# Patient Record
Sex: FEMALE | Race: WHITE | NOT HISPANIC OR LATINO
[De-identification: names, ages, dates, MRNs, and addresses within clinical notes are randomized per-mention and may not be internally consistent; named-entity substitution may affect disease eponyms.]

---

## 2017-04-20 ENCOUNTER — RX RENEWAL (OUTPATIENT)
Age: 67
End: 2017-04-20

## 2017-04-28 DIAGNOSIS — N63 UNSPECIFIED LUMP IN BREAST: ICD-10-CM

## 2017-05-09 ENCOUNTER — OUTPATIENT (OUTPATIENT)
Dept: OUTPATIENT SERVICES | Facility: HOSPITAL | Age: 67
LOS: 1 days | Discharge: HOME | End: 2017-05-09

## 2017-05-09 ENCOUNTER — APPOINTMENT (OUTPATIENT)
Dept: HEMATOLOGY ONCOLOGY | Facility: CLINIC | Age: 67
End: 2017-05-09

## 2017-05-09 VITALS
TEMPERATURE: 98.1 F | HEART RATE: 97 BPM | RESPIRATION RATE: 14 BRPM | DIASTOLIC BLOOD PRESSURE: 86 MMHG | HEIGHT: 65 IN | SYSTOLIC BLOOD PRESSURE: 138 MMHG | WEIGHT: 200 LBS | BODY MASS INDEX: 33.32 KG/M2

## 2017-05-10 LAB
ALBUMIN SERPL-MCNC: 4.3 G/DL
ALBUMIN/GLOB SERPL: 1.87
ALP SERPL-CCNC: 67 IU/L
ALT SERPL-CCNC: 27 IU/L
ANION GAP SERPL CALC-SCNC: 13 MEQ/L
AST SERPL-CCNC: 26 IU/L
BILIRUB SERPL-MCNC: 1 MG/DL
BUN SERPL-MCNC: 12 MG/DL
BUN/CREAT SERPL: 18.8 %
CALCIUM SERPL-MCNC: 10.1 MG/DL
CHLORIDE SERPL-SCNC: 99 MEQ/L
CO2 SERPL-SCNC: 27 MEQ/L
CREAT SERPL-MCNC: 0.64 MG/DL
GFR SERPL CREATININE-BSD FRML MDRD: 93
GLUCOSE SERPL-MCNC: 84 MG/DL
POTASSIUM SERPL-SCNC: 4.4 MMOL/L
PROT SERPL-MCNC: 6.6 G/DL
SODIUM SERPL-SCNC: 139 MEQ/L

## 2017-05-23 ENCOUNTER — OUTPATIENT (OUTPATIENT)
Dept: OUTPATIENT SERVICES | Facility: HOSPITAL | Age: 67
LOS: 1 days | Discharge: HOME | End: 2017-05-23

## 2017-06-28 DIAGNOSIS — N63 UNSPECIFIED LUMP IN BREAST: ICD-10-CM

## 2017-06-28 DIAGNOSIS — D24.1 BENIGN NEOPLASM OF RIGHT BREAST: ICD-10-CM

## 2018-10-22 ENCOUNTER — FORM ENCOUNTER (OUTPATIENT)
Age: 68
End: 2018-10-22

## 2018-10-23 ENCOUNTER — OUTPATIENT (OUTPATIENT)
Dept: OUTPATIENT SERVICES | Facility: HOSPITAL | Age: 68
LOS: 1 days | Discharge: HOME | End: 2018-10-23

## 2018-10-23 DIAGNOSIS — R92.8 OTHER ABNORMAL AND INCONCLUSIVE FINDINGS ON DIAGNOSTIC IMAGING OF BREAST: ICD-10-CM

## 2018-10-30 ENCOUNTER — FORM ENCOUNTER (OUTPATIENT)
Age: 68
End: 2018-10-30

## 2018-10-31 ENCOUNTER — RESULT REVIEW (OUTPATIENT)
Age: 68
End: 2018-10-31

## 2018-10-31 ENCOUNTER — OUTPATIENT (OUTPATIENT)
Dept: OUTPATIENT SERVICES | Facility: HOSPITAL | Age: 68
LOS: 1 days | Discharge: HOME | End: 2018-10-31

## 2018-11-02 LAB — SURGICAL PATHOLOGY STUDY: SIGNIFICANT CHANGE UP

## 2018-11-06 DIAGNOSIS — N63.20 UNSPECIFIED LUMP IN THE LEFT BREAST, UNSPECIFIED QUADRANT: ICD-10-CM

## 2018-11-06 DIAGNOSIS — N64.2 ATROPHY OF BREAST: ICD-10-CM

## 2018-11-06 DIAGNOSIS — R92.1 MAMMOGRAPHIC CALCIFICATION FOUND ON DIAGNOSTIC IMAGING OF BREAST: ICD-10-CM

## 2019-01-30 ENCOUNTER — LABORATORY RESULT (OUTPATIENT)
Age: 69
End: 2019-01-30

## 2019-01-30 ENCOUNTER — APPOINTMENT (OUTPATIENT)
Dept: HEMATOLOGY ONCOLOGY | Facility: CLINIC | Age: 69
End: 2019-01-30

## 2019-01-30 VITALS
HEART RATE: 117 BPM | SYSTOLIC BLOOD PRESSURE: 158 MMHG | BODY MASS INDEX: 33.99 KG/M2 | HEIGHT: 65 IN | TEMPERATURE: 97.9 F | WEIGHT: 204 LBS | RESPIRATION RATE: 16 BRPM | DIASTOLIC BLOOD PRESSURE: 96 MMHG

## 2019-01-30 DIAGNOSIS — N63.20 UNSPECIFIED LUMP IN THE LEFT BREAST, UNSPECIFIED QUADRANT: ICD-10-CM

## 2019-01-30 NOTE — REVIEW OF SYSTEMS
[Fever] : no fever [Eye Pain] : no eye pain [Dysphagia] : no dysphagia [Chest Pain] : no chest pain [Shortness Of Breath] : no shortness of breath [Abdominal Pain] : no abdominal pain [Dysuria] : no dysuria [Joint Pain] : joint pain [Joint Stiffness] : joint stiffness [Muscle Pain] : no muscle pain [Muscle Weakness] : no muscle weakness [Confused] : no confusion [Dizziness] : no dizziness [Fainting] : no fainting [Difficulty Walking] : no difficulty walking [Negative] : Allergic/Immunologic

## 2019-01-30 NOTE — ASSESSMENT
[FreeTextEntry1] : 1. Left papillary carcinoma stage O TiCNXMX, ER/MS+, HER2-; s/p lumpectomy 2014 f/b RT and tamoxifen\par 2. left DCIS s/p lumpectomy 2014\par \par - suggest tamoxifen for total of 10 years (started 2014)\par - dexa scan\par rtc in one month [Curative] : Goals of care discussed with patient: Curative

## 2019-01-30 NOTE — HISTORY OF PRESENT ILLNESS
[de-identified] : 69 yo woman comes for a follow up. She was diagnosed with left breast papillary carcinoma s/p resection in 2014 Stage O TisNXMX, ER/LA+,HER2-, f/b RT and hormonal blockade with tamoxifen. Repeat mammogram revealed an abnormality in 2014 and lumpectomy revealed left DCIS. She continued taking tamoxifen. \par \par FH : SISTER had breast cancer at the age of 30; father had lung cancer\par PMH: HTN, arthritis\par

## 2019-01-31 LAB
ALBUMIN SERPL ELPH-MCNC: 4.6 G/DL
ALP BLD-CCNC: 98 U/L
ALT SERPL-CCNC: 26 U/L
ANION GAP SERPL CALC-SCNC: 17 MMOL/L
AST SERPL-CCNC: 23 U/L
BILIRUB DIRECT SERPL-MCNC: <0.2 MG/DL
BILIRUB INDIRECT SERPL-MCNC: NORMAL MG/DL
BILIRUB SERPL-MCNC: 0.2 MG/DL
BUN SERPL-MCNC: 16 MG/DL
CALCIUM SERPL-MCNC: 9.5 MG/DL
CHLORIDE SERPL-SCNC: 96 MMOL/L
CO2 SERPL-SCNC: 26 MMOL/L
CREAT SERPL-MCNC: 0.6 MG/DL
GLUCOSE SERPL-MCNC: 99 MG/DL
HCT VFR BLD CALC: 37.1 %
HGB BLD-MCNC: 12.6 G/DL
MCHC RBC-ENTMCNC: 30 PG
MCHC RBC-ENTMCNC: 34 G/DL
MCV RBC AUTO: 88.3 FL
PLATELET # BLD AUTO: 247 K/UL
PMV BLD: 9.4 FL
POTASSIUM SERPL-SCNC: 3.9 MMOL/L
PROT SERPL-MCNC: 7.2 G/DL
RBC # BLD: 4.2 M/UL
RBC # FLD: 12.8 %
SODIUM SERPL-SCNC: 139 MMOL/L
WBC # FLD AUTO: 7.5 K/UL

## 2019-02-06 ENCOUNTER — OUTPATIENT (OUTPATIENT)
Dept: OUTPATIENT SERVICES | Facility: HOSPITAL | Age: 69
LOS: 1 days | Discharge: HOME | End: 2019-02-06

## 2019-02-07 DIAGNOSIS — Z13.820 ENCOUNTER FOR SCREENING FOR OSTEOPOROSIS: ICD-10-CM

## 2019-02-07 DIAGNOSIS — N95.9 UNSPECIFIED MENOPAUSAL AND PERIMENOPAUSAL DISORDER: ICD-10-CM

## 2019-02-27 ENCOUNTER — APPOINTMENT (OUTPATIENT)
Dept: HEMATOLOGY ONCOLOGY | Facility: CLINIC | Age: 69
End: 2019-02-27

## 2019-02-27 ENCOUNTER — OUTPATIENT (OUTPATIENT)
Dept: OUTPATIENT SERVICES | Facility: HOSPITAL | Age: 69
LOS: 1 days | Discharge: HOME | End: 2019-02-27

## 2019-02-27 VITALS
SYSTOLIC BLOOD PRESSURE: 159 MMHG | WEIGHT: 204 LBS | TEMPERATURE: 99 F | HEIGHT: 65 IN | HEART RATE: 114 BPM | RESPIRATION RATE: 16 BRPM | DIASTOLIC BLOOD PRESSURE: 93 MMHG | BODY MASS INDEX: 33.99 KG/M2

## 2019-02-27 DIAGNOSIS — C50.919 MALIGNANT NEOPLASM OF UNSPECIFIED SITE OF UNSPECIFIED FEMALE BREAST: ICD-10-CM

## 2019-02-27 NOTE — HISTORY OF PRESENT ILLNESS
[de-identified] : 69 yo woman comes for a follow up. She was diagnosed with left breast papillary carcinoma s/p resection in 2014 Stage O TisNXMX, ER/WY+,HER2-, f/b RT and hormonal blockade with tamoxifen. Repeat mammogram revealed an abnormality in 2014 and lumpectomy revealed left DCIS. She continued taking tamoxifen. \par \par FH : SISTER had breast cancer at the age of 30; father had lung cancer\par PMH: HTN, arthritis\par \par DEXA 2019: no evidence of osteopenia/osteoporosis

## 2019-02-27 NOTE — ASSESSMENT
[FreeTextEntry1] : 1. Left papillary carcinoma stage O TiCNXMX, ER/WA+, HER2-; s/p lumpectomy 2014 f/b RT and tamoxifen\par 2. left DCIS s/p lumpectomy 2014\par \par - suggest tamoxifen for total of 10 years (started 2014)\par - dexa scan reviewed ; normal density\par - mammogram in 6 mos\par rtc in 6 mos [Curative] : Goals of care discussed with patient: Curative

## 2019-08-20 ENCOUNTER — APPOINTMENT (OUTPATIENT)
Dept: HEMATOLOGY ONCOLOGY | Facility: CLINIC | Age: 69
End: 2019-08-20
Payer: MEDICARE

## 2019-08-20 ENCOUNTER — OUTPATIENT (OUTPATIENT)
Dept: OUTPATIENT SERVICES | Facility: HOSPITAL | Age: 69
LOS: 1 days | Discharge: HOME | End: 2019-08-20

## 2019-08-20 VITALS
SYSTOLIC BLOOD PRESSURE: 160 MMHG | HEIGHT: 65 IN | WEIGHT: 204 LBS | HEART RATE: 97 BPM | DIASTOLIC BLOOD PRESSURE: 80 MMHG | RESPIRATION RATE: 16 BRPM | BODY MASS INDEX: 33.99 KG/M2 | TEMPERATURE: 98.2 F

## 2019-08-20 PROCEDURE — 99214 OFFICE O/P EST MOD 30 MIN: CPT

## 2019-08-20 NOTE — ASSESSMENT
[Curative] : Goals of care discussed with patient: Curative [FreeTextEntry1] : 1. Left papillary carcinoma stage O TiCNXMX, ER/AR+, HER2-; s/p lumpectomy 2014 f/b RT and tamoxifen\par 2. left DCIS s/p lumpectomy 2014\par \par - suggest hormonal blockade for total of 10 years (started 2014). However given that she is having hot flashes almost every day, we discussed either stopping it or changing it to AI. After discussion,  she will return to visit us in one month and will let us know if she would like to take AI. Till then, she will not take tamoxifen. \par - dexa scan reviewed ; normal density\par - mammogram due in Oct 2019\par -rtc in 1 mos

## 2019-08-20 NOTE — HISTORY OF PRESENT ILLNESS
[de-identified] : 67 yo woman comes for a follow up. She was diagnosed with left breast papillary carcinoma s/p resection in 2014 Stage O TisNXMX, ER/PA+,HER2-, f/b RT and hormonal blockade with tamoxifen. Repeat mammogram revealed an abnormality in 2014 and lumpectomy revealed left DCIS. She continued taking tamoxifen. \par \par FH : SISTER had breast cancer at the age of 30; father had lung cancer\par PMH: HTN, arthritis\par \par DEXA 2019: no evidence of osteopenia/osteoporosis [de-identified] : 08/20/2019\par Here for a scheduled follow up visit. \par Completed 5 years of tamoxifen. tolerated well but has bad hot flashes almost every day. Denies joint pains. \par Mammogram due in 2019 october

## 2019-08-20 NOTE — REVIEW OF SYSTEMS
[Joint Pain] : joint pain [Joint Stiffness] : joint stiffness [Negative] : Heme/Lymph [Fever] : no fever [Eye Pain] : no eye pain [Dysphagia] : no dysphagia [Chest Pain] : no chest pain [Abdominal Pain] : no abdominal pain [Shortness Of Breath] : no shortness of breath [Dysuria] : no dysuria [Muscle Pain] : no muscle pain [Confused] : no confusion [Muscle Weakness] : no muscle weakness [Dizziness] : no dizziness [Fainting] : no fainting [Difficulty Walking] : no difficulty walking

## 2019-08-22 DIAGNOSIS — D05.10 INTRADUCTAL CARCINOMA IN SITU OF UNSPECIFIED BREAST: ICD-10-CM

## 2019-10-02 ENCOUNTER — APPOINTMENT (OUTPATIENT)
Dept: HEMATOLOGY ONCOLOGY | Facility: CLINIC | Age: 69
End: 2019-10-02

## 2019-11-12 ENCOUNTER — EMERGENCY (EMERGENCY)
Facility: HOSPITAL | Age: 69
LOS: 0 days | Discharge: HOME | End: 2019-11-12
Attending: EMERGENCY MEDICINE | Admitting: EMERGENCY MEDICINE
Payer: MEDICARE

## 2019-11-12 VITALS
WEIGHT: 199.08 LBS | HEIGHT: 65 IN | SYSTOLIC BLOOD PRESSURE: 173 MMHG | HEART RATE: 101 BPM | TEMPERATURE: 97 F | RESPIRATION RATE: 20 BRPM | DIASTOLIC BLOOD PRESSURE: 90 MMHG | OXYGEN SATURATION: 98 %

## 2019-11-12 VITALS
SYSTOLIC BLOOD PRESSURE: 150 MMHG | HEART RATE: 97 BPM | TEMPERATURE: 98 F | DIASTOLIC BLOOD PRESSURE: 99 MMHG | OXYGEN SATURATION: 99 % | RESPIRATION RATE: 18 BRPM

## 2019-11-12 DIAGNOSIS — R42 DIZZINESS AND GIDDINESS: ICD-10-CM

## 2019-11-12 DIAGNOSIS — I10 ESSENTIAL (PRIMARY) HYPERTENSION: ICD-10-CM

## 2019-11-12 DIAGNOSIS — Z82.49 FAMILY HISTORY OF ISCHEMIC HEART DISEASE AND OTHER DISEASES OF THE CIRCULATORY SYSTEM: ICD-10-CM

## 2019-11-12 LAB
ALBUMIN SERPL ELPH-MCNC: 4.6 G/DL — SIGNIFICANT CHANGE UP (ref 3.5–5.2)
ALP SERPL-CCNC: 92 U/L — SIGNIFICANT CHANGE UP (ref 30–115)
ALT FLD-CCNC: 24 U/L — SIGNIFICANT CHANGE UP (ref 0–41)
ANION GAP SERPL CALC-SCNC: 11 MMOL/L — SIGNIFICANT CHANGE UP (ref 7–14)
AST SERPL-CCNC: 23 U/L — SIGNIFICANT CHANGE UP (ref 0–41)
BILIRUB SERPL-MCNC: 0.4 MG/DL — SIGNIFICANT CHANGE UP (ref 0.2–1.2)
BUN SERPL-MCNC: 15 MG/DL — SIGNIFICANT CHANGE UP (ref 10–20)
CA-I SERPL-SCNC: 1.25 MMOL/L — SIGNIFICANT CHANGE UP (ref 1.12–1.3)
CALCIUM SERPL-MCNC: 9.1 MG/DL — SIGNIFICANT CHANGE UP (ref 8.5–10.1)
CHLORIDE SERPL-SCNC: 104 MMOL/L — SIGNIFICANT CHANGE UP (ref 98–110)
CO2 SERPL-SCNC: 24 MMOL/L — SIGNIFICANT CHANGE UP (ref 17–32)
CREAT SERPL-MCNC: 0.6 MG/DL — LOW (ref 0.7–1.5)
GAS PNL BLDV: 141 MMOL/L — SIGNIFICANT CHANGE UP (ref 136–145)
GAS PNL BLDV: SIGNIFICANT CHANGE UP
GLUCOSE SERPL-MCNC: 129 MG/DL — HIGH (ref 70–99)
HCO3 BLDV-SCNC: 26 MMOL/L — SIGNIFICANT CHANGE UP (ref 22–29)
HCT VFR BLD CALC: 37.5 % — SIGNIFICANT CHANGE UP (ref 37–47)
HCT VFR BLDA CALC: 39.4 % — SIGNIFICANT CHANGE UP (ref 34–44)
HGB BLD CALC-MCNC: 12.9 G/DL — LOW (ref 14–18)
HGB BLD-MCNC: 12.4 G/DL — SIGNIFICANT CHANGE UP (ref 12–16)
HOROWITZ INDEX BLDV+IHG-RTO: 21 — SIGNIFICANT CHANGE UP
LACTATE BLDV-MCNC: 2.1 MMOL/L — HIGH (ref 0.5–1.6)
MCHC RBC-ENTMCNC: 29.8 PG — SIGNIFICANT CHANGE UP (ref 27–31)
MCHC RBC-ENTMCNC: 33.1 G/DL — SIGNIFICANT CHANGE UP (ref 32–37)
MCV RBC AUTO: 90.1 FL — SIGNIFICANT CHANGE UP (ref 81–99)
NRBC # BLD: 0 /100 WBCS — SIGNIFICANT CHANGE UP (ref 0–0)
PCO2 BLDV: 46 MMHG — SIGNIFICANT CHANGE UP (ref 41–51)
PH BLDV: 7.36 — SIGNIFICANT CHANGE UP (ref 7.26–7.43)
PLATELET # BLD AUTO: 251 K/UL — SIGNIFICANT CHANGE UP (ref 130–400)
PO2 BLDV: 33 MMHG — SIGNIFICANT CHANGE UP (ref 20–40)
POTASSIUM BLDV-SCNC: 4.2 MMOL/L — SIGNIFICANT CHANGE UP (ref 3.3–5.6)
POTASSIUM SERPL-MCNC: 4.4 MMOL/L — SIGNIFICANT CHANGE UP (ref 3.5–5)
POTASSIUM SERPL-SCNC: 4.4 MMOL/L — SIGNIFICANT CHANGE UP (ref 3.5–5)
PROT SERPL-MCNC: 7 G/DL — SIGNIFICANT CHANGE UP (ref 6–8)
RBC # BLD: 4.16 M/UL — LOW (ref 4.2–5.4)
RBC # FLD: 12.7 % — SIGNIFICANT CHANGE UP (ref 11.5–14.5)
SAO2 % BLDV: 64 % — SIGNIFICANT CHANGE UP
SODIUM SERPL-SCNC: 139 MMOL/L — SIGNIFICANT CHANGE UP (ref 135–146)
TROPONIN T SERPL-MCNC: <0.01 NG/ML — SIGNIFICANT CHANGE UP
WBC # BLD: 7.72 K/UL — SIGNIFICANT CHANGE UP (ref 4.8–10.8)
WBC # FLD AUTO: 7.72 K/UL — SIGNIFICANT CHANGE UP (ref 4.8–10.8)

## 2019-11-12 PROCEDURE — 70450 CT HEAD/BRAIN W/O DYE: CPT | Mod: 26,59

## 2019-11-12 PROCEDURE — 70496 CT ANGIOGRAPHY HEAD: CPT | Mod: 26

## 2019-11-12 PROCEDURE — 99284 EMERGENCY DEPT VISIT MOD MDM: CPT

## 2019-11-12 PROCEDURE — 70498 CT ANGIOGRAPHY NECK: CPT | Mod: 26

## 2019-11-12 RX ORDER — ONDANSETRON 8 MG/1
4 TABLET, FILM COATED ORAL ONCE
Refills: 0 | Status: DISCONTINUED | OUTPATIENT
Start: 2019-11-12 | End: 2019-11-12

## 2019-11-12 RX ORDER — MECLIZINE HCL 12.5 MG
1 TABLET ORAL
Qty: 14 | Refills: 0
Start: 2019-11-12 | End: 2019-11-18

## 2019-11-12 RX ORDER — SODIUM CHLORIDE 9 MG/ML
1000 INJECTION, SOLUTION INTRAVENOUS ONCE
Refills: 0 | Status: COMPLETED | OUTPATIENT
Start: 2019-11-12 | End: 2019-11-12

## 2019-11-12 RX ORDER — TAMOXIFEN CITRATE 20 MG/1
1 TABLET, FILM COATED ORAL
Qty: 0 | Refills: 0 | DISCHARGE

## 2019-11-12 RX ORDER — METOCLOPRAMIDE HCL 10 MG
10 TABLET ORAL ONCE
Refills: 0 | Status: COMPLETED | OUTPATIENT
Start: 2019-11-12 | End: 2019-11-12

## 2019-11-12 RX ADMIN — Medication 10 MILLIGRAM(S): at 08:51

## 2019-11-12 RX ADMIN — SODIUM CHLORIDE 1000 MILLILITER(S): 9 INJECTION, SOLUTION INTRAVENOUS at 09:17

## 2019-11-12 NOTE — ED PROVIDER NOTE - NS ED ROS FT
Constitutional:  No fevers or chills.  Eyes:  No visual changes, eye pain, or discharge.  ENT:  No hearing changes. No sore throat.  Neck:  No neck stiffness.  Cardiac:  No CP or edema.  Resp:  No cough or SOB. No hemoptysis.   GI:  No nausea, vomiting, diarrhea, or abdominal pain.  :  No dysuria, frequency, or hematuria.  MSK:  No myalgias or joint pain/swelling.  Neuro:  No headache/weakness. +Dizziness.  Skin:  No skin rash.

## 2019-11-12 NOTE — ED PROVIDER NOTE - NSFOLLOWUPINSTRUCTIONS_ED_ALL_ED_FT
Please follow-up as soon as possible with the neurologist provided.    Vertigo  Image   Vertigo means that you feel like you are moving when you are not. Vertigo can also make you feel like things around you are moving when they are not. This feeling can come and go at any time. Vertigo often goes away on its own.  Follow these instructions at home:  Avoid making fast movements.Avoid driving.Avoid using heavy machinery.Avoid doing any task or activity that might cause danger to you or other people if you would have a vertigo attack while you are doing it.Sit down right away if you feel dizzy or have trouble with your balance.Take over-the-counter and prescription medicines only as told by your doctor.Follow instructions from your doctor about which positions or movements you should avoid.Drink enough fluid to keep your pee (urine) clear or pale yellow.Keep all follow-up visits as told by your doctor. This is important.Contact a doctor if:  Medicine does not help your vertigo.You have a fever.Your problems get worse or you have new symptoms.Your family or friends see changes in your behavior.You feel sick to your stomach (nauseous) or you throw up (vomit).You have a “pins and needles” feeling or you are numb in part of your body.Get help right away if:  You have trouble moving or talking.You are always dizzy.You pass out (faint).You get very bad headaches.You feel weak or have trouble using your hands, arms, or legs.You have changes in your hearing.You have changes in your seeing (vision).You get a stiff neck.Bright light starts to bother you.

## 2019-11-12 NOTE — ED PROVIDER NOTE - CARE PROVIDER_API CALL
Marysol Murray)  Neurology  [address] [place] [address], [address]  [place] [address]  Phone: [phone]  Fax: [phone]  Follow Up Time: 1-3 Days

## 2019-11-12 NOTE — ED PROVIDER NOTE - OBJECTIVE STATEMENT
68yo F with breast cancer s/p lumpectomy, on Tamoxifen, osteoarthritis neck/spine, HTN, and vertigo presenting to ED with intermittent episodes of vertigo/dizziness that started last week. Patient states episodes last for minutes, dizziness is worse with certain positions such as bending over, improved with rest. Denies any injuries/traumas/falls, hearing loss, HA, changes in speech/vision, extremity weakness, numbness/tingling, CP, SOB, abd pain, or n/v/d. Also states she had episodes where she felt very sweaty during vertigo episodes and then had to use the restroom. No incontinence, urinary sxs, or bloody stool. Family hx of cerebral aneurysm in mother,  when she was 54 from aneurysm rupture.

## 2019-11-12 NOTE — ED PROVIDER NOTE - OTHER FINDINGS
QTc 447. No ectopic beats, delta wave, epsilon wave, brugada pattern, or other suggestion of proarrhythmic abnormality.

## 2019-11-12 NOTE — ED PROVIDER NOTE - PHYSICAL EXAMINATION
PHYSICAL EXAM: I have reviewed current vital signs.  GENERAL: NAD, well-nourished; well-developed.  HEAD:  Normocephalic, atraumatic.  EYES: EOMI, PERRL, conjunctiva and sclera clear.  ENT: MMM, no erythema/exudates.  NECK: Supple, full ROM.  CHEST/LUNG: Clear to auscultation bilaterally; no wheezes, rales, or rhonchi.  HEART: Regular rate and rhythm, normal S1 and S2; no murmurs, rubs, or gallops.  ABDOMEN: Soft, nontender, nondistended.  EXTREMITIES:  2+ peripheral pulses, no edema, full ROM.  PSYCH: Cooperative, appropriate, normal mood and affect.  NEUROLOGY: A&O x 3. Motor 5/5. Sensory intact. No focal neurological deficits. CN II - XII intact.  SKIN: Warm and dry.

## 2019-11-12 NOTE — ED PROVIDER NOTE - PATIENT PORTAL LINK FT
You can access the FollowMyHealth Patient Portal offered by [facility] by registering at the following website: [email or web address]. By joining [employer]’s FollowMyHealth portal, you will also be able to view your health information using other applications (apps) compatible with our system.

## 2019-11-12 NOTE — ED PROVIDER NOTE - ATTENDING CONTRIBUTION TO CARE
69 y F with breast cancer s/p lumpectomy, on Tamoxifen x years, osteoarthritis cervical spine, HTN, and vertigo presenting to ED with intermittent episodes of vertigo/dizziness that started last week. Had been assessed for this prior with CT 20 years ago, which at that time was negative. FHx of cerebral aneurysm w rupture and death in her mother. Denies fever, chills, vomiting, diarrhea, dysuria, hematuria, numbness, tingling, weakness, cough, SOB, pain anywhere, recent travel or surgery. Previously in normal state of health. No trauma. Notes that when occurring her gait is so affected that she cannot walk even with assistance. Worsens with upright posture and somewhat with head movement. currently absent.  Exam: NAD, NCAT, HEENT: mmm, EOMI, PERRLA, Neck: no bruit, supple, nontender, nl ROM, Heart: tachy to 104, RR, no murmur, Lungs: BCTA, no signs of increased WOB, Abd: NTND, no guarding or rebound, no hernia palpated, no CVAT. MSK: chest, back, and ext nontender, nl rom, no deformity. Neuro: A&Ox3, CN II-XII intact, no induction of nystagmus with rapid head movements. normal strength 5/5 all aspects of b/l UE and LE, no drift of b/l UE and LE, nl sensation throughout all 4 ext, normal speech, gait, and coordination.   A/P: Eval for possible role of cerebral aneurysm w/o rupture vs stroke vs structural defect causing intermittent posterior circulatory insufficiency leading to cerebellar source of gait ataxia, though not witnessed currently. Labs, imaging, fluids, symptom control, reassess.

## 2019-11-12 NOTE — ED PROVIDER NOTE - PROGRESS NOTE DETAILS
Informed patient and family of lab and radiology results. Discussed patient care with Dr. Whitfield, neurology. States patient may f/u outpatient for MRI and will see her. Full DC instructions and precaution signs and symptoms discussed. Proper follow up ensured.  Medications administered and prescribed/OTC home meds discussed.  All questions and concerns from patient addressed.  Understanding of instructions verbalized.

## 2019-11-18 PROBLEM — I10 ESSENTIAL (PRIMARY) HYPERTENSION: Chronic | Status: ACTIVE | Noted: 2019-11-12

## 2019-11-18 PROBLEM — R42 DIZZINESS AND GIDDINESS: Chronic | Status: ACTIVE | Noted: 2019-11-12

## 2019-11-18 PROBLEM — C50.919 MALIGNANT NEOPLASM OF UNSPECIFIED SITE OF UNSPECIFIED FEMALE BREAST: Chronic | Status: ACTIVE | Noted: 2019-11-12

## 2019-11-22 ENCOUNTER — APPOINTMENT (OUTPATIENT)
Dept: NEUROLOGY | Facility: CLINIC | Age: 69
End: 2019-11-22
Payer: MEDICARE

## 2019-11-22 VITALS
HEIGHT: 65 IN | BODY MASS INDEX: 33.15 KG/M2 | OXYGEN SATURATION: 98 % | SYSTOLIC BLOOD PRESSURE: 153 MMHG | HEART RATE: 137 BPM | WEIGHT: 199 LBS | TEMPERATURE: 98.2 F | DIASTOLIC BLOOD PRESSURE: 85 MMHG

## 2019-11-22 DIAGNOSIS — R42 DIZZINESS AND GIDDINESS: ICD-10-CM

## 2019-11-22 PROCEDURE — 99203 OFFICE O/P NEW LOW 30 MIN: CPT

## 2019-11-22 RX ORDER — MECLIZINE HYDROCHLORIDE 25 MG/1
25 TABLET ORAL
Qty: 270 | Refills: 2 | Status: ACTIVE | COMMUNITY
Start: 2019-11-22 | End: 1900-01-01

## 2019-11-22 NOTE — HISTORY OF PRESENT ILLNESS
[FreeTextEntry1] : patient is a 70 yo female with hx of breast ca and htn who presents for ER f/u. patient was seen in the ER for episode of vertigo. Patient reports feeling vertigo every so often. this last week it was severe.  she feels imbalanced.  this occurs at least once a week.  \par she had vertigo 20 y ago.  \par in the ER she was put on meclizine when she went to ER on Nov 12\par no hearing loss, tinnitus or antecedent illness. \par not clearly positional.  felt room spinning clockwise\par better with her eyes closed.  \par the symptoms can sometimes wake her out of her sleep.

## 2019-11-22 NOTE — ASSESSMENT
[FreeTextEntry1] : 70 yo female with hx of breast ca s/p lumpectomy x2 and radiation, htn and dld presents with episodic vertigo\par likely bppv\par \par rec mri brain wwo gado to rule out structural etiology\par vestibular therapy if symptoms recur

## 2019-11-22 NOTE — PHYSICAL EXAM
[FreeTextEntry1] : Neurological Exam: \par Mental status: Awake, alert and oriented x3.  Recent and remote memory intact.  Naming, repetition and comprehension intact.  Attention/concentration intact.  No dysarthria, no aphasia.  Fund of knowledge appropriate.  \par Cranial nerves: Pupils equally round and reactive to light, visual fields full, no nystagmus, extraocular muscles intact, V1 through V3 intact bilaterally and symmetric, face symmetric, hearing intact to finger rub, palate elevation symmetric, tongue was midline.\par Motor:  MRC grading 5/5 b/l UE/LE.   strength 5/5.  Normal tone and bulk.  No abnormal movements.  \par Sensation: Intact to light touch, proprioception, and pinprick. \par Coordination: No dysmetria on finger-to-nose and heel-to-shin.  No dysdiadokinesia.\par Reflexes: 2+ in bilateral UE/LE, downgoing toes bilaterally. (-) Kay.\par Gait: Narrow and steady. No ataxia.  Romberg negative\par \par

## 2020-10-28 ENCOUNTER — RESULT REVIEW (OUTPATIENT)
Age: 70
End: 2020-10-28

## 2020-10-28 ENCOUNTER — OUTPATIENT (OUTPATIENT)
Dept: OUTPATIENT SERVICES | Facility: HOSPITAL | Age: 70
LOS: 1 days | Discharge: HOME | End: 2020-10-28
Payer: MEDICARE

## 2020-10-28 DIAGNOSIS — Z12.31 ENCOUNTER FOR SCREENING MAMMOGRAM FOR MALIGNANT NEOPLASM OF BREAST: ICD-10-CM

## 2020-10-28 PROCEDURE — 77067 SCR MAMMO BI INCL CAD: CPT | Mod: 26

## 2020-10-28 PROCEDURE — 77063 BREAST TOMOSYNTHESIS BI: CPT | Mod: 26

## 2020-11-06 ENCOUNTER — RESULT REVIEW (OUTPATIENT)
Age: 70
End: 2020-11-06

## 2020-11-06 ENCOUNTER — OUTPATIENT (OUTPATIENT)
Dept: OUTPATIENT SERVICES | Facility: HOSPITAL | Age: 70
LOS: 1 days | Discharge: HOME | End: 2020-11-06
Payer: MEDICARE

## 2020-11-06 DIAGNOSIS — R92.8 OTHER ABNORMAL AND INCONCLUSIVE FINDINGS ON DIAGNOSTIC IMAGING OF BREAST: ICD-10-CM

## 2020-11-06 PROCEDURE — 77065 DX MAMMO INCL CAD UNI: CPT | Mod: 26,RT

## 2020-11-06 PROCEDURE — G0279: CPT | Mod: 26

## 2020-11-06 PROCEDURE — 76641 ULTRASOUND BREAST COMPLETE: CPT | Mod: 26,RT

## 2020-11-19 ENCOUNTER — NON-APPOINTMENT (OUTPATIENT)
Age: 70
End: 2020-11-19

## 2021-02-09 ENCOUNTER — APPOINTMENT (OUTPATIENT)
Dept: BREAST CENTER | Facility: CLINIC | Age: 71
End: 2021-02-09

## 2021-02-09 NOTE — DATA REVIEWED
[FreeTextEntry1] : B/L Screening Mammo - 10/19/2020:\par MAMMOGRAM FINDINGS:\par Mammography was performed including the following views: bilateral craniocaudal with tomosynthesis, bilateral mediolateral oblique with tomosynthesis.  The examination includes digital synthetic 2D and digital tomosynthesis 3D images. Additional imaging analysis was performed using CAD (computer-aided detection) software.\par \par The breasts are heterogeneously dense, which may obscure small masses.\par \par Finding 1:  There is an area of architectural distortion at the site of lumpectomy seen in the lateral of the left breast.\par \par Finding 2:  There is a stable area of benign architectural distortion corresponding to the site of surgery seen in the medial of the left breast.\par \par Finding 3:  There are three masses seen in the right breast.\par \par IMPRESSION:\par Finding 1:  Area of architectural distortion at the site of lumpectomy in the lateral of the left breast is benign finding.\par \par Finding 2:  Stable Area of benign architectural distortion corresponding to the site of surgery and consistent with post operative fibrosis in the medial of the left breast is benign finding.\par \par Finding 3:  Masses in the right breast require additional evaluation.\par \par RECOMMENDATION:\par Patient will be recalled for additional mammographic views and, if indicated, breast ultrasound.\par \par ASSESSMENT:\par BI-RADS Category 0:  Incomplete: Needs Additional Imaging \par \par \par RIght [facility] Dx Mammo [facility] - 11/06/2020:\par Breast composition: The breasts are heterogeneously dense, which may obscure small masses.\par \par Findings:\par \par Mammogram:\par There are multiple subcentimeter oval circumscribed masses in the superior and lateral aspect of the right breast at area of mammographic concern. Short interval follow-up recommended. No suspicious calcification architectural distortion in the right breast.\par \par \par Ultrasound:\par \par Targeted unilateral right breast ultrasound was performed.\par \par At 1:00 N8 there is redemonstration of biopsy proven benign mass (fibroadenoma).\par Also noted are multiple oval circumscribed masses in the upper outer quadrant of the right breast the largest measuring 0.2 cm favored to be simple cysts.\par \par Impression: Multiple subcentimeter oval circumscribed masses in the superior and lateral aspects of the right breast. Short interval follow-up recommended.\par \par Recommendation: Follow-up unilateral diagnostic mammogram in 6 months.\par \par BI-RADS category 3: Probably Benign\par \par

## 2021-02-09 NOTE — HISTORY OF PRESENT ILLNESS
[FreeTextEntry1] : h/o right breast benign lumpectomy [facility].\par \par FHx breast cancer - sister at 30, 47 BRCA (-).\par \par Left papilloma/ADH on Stereo 4/3/14; LUOQ, anterior, 8 mm.\par s/p Left NLOC 8/11/14 - 5 mm solid papillary carcinoma, intermed grade, (-) margins.  Sclerosing intraductal \par papilloma, ADH.  ER/TX (+).\par (+) whole breast RT.  On Tamoxifen - Dr. Becker.\par Left intraductal papilloma on Stereo 12/23/15; LLIQ (cork).\par s/p Left NLOC - 02/26/16 = Healing prior biopsy site with no residual intraductal papillary lesion identified.

## 2021-11-09 ENCOUNTER — RESULT REVIEW (OUTPATIENT)
Age: 71
End: 2021-11-09

## 2021-11-24 ENCOUNTER — OUTPATIENT (OUTPATIENT)
Dept: OUTPATIENT SERVICES | Facility: HOSPITAL | Age: 71
LOS: 1 days | Discharge: HOME | End: 2021-11-24
Payer: MEDICARE

## 2021-11-24 ENCOUNTER — RESULT REVIEW (OUTPATIENT)
Age: 71
End: 2021-11-24

## 2021-11-24 DIAGNOSIS — R92.8 OTHER ABNORMAL AND INCONCLUSIVE FINDINGS ON DIAGNOSTIC IMAGING OF BREAST: ICD-10-CM

## 2021-11-24 PROCEDURE — G0279: CPT | Mod: 26

## 2021-11-24 PROCEDURE — 76641 ULTRASOUND BREAST COMPLETE: CPT | Mod: 26,50

## 2021-11-24 PROCEDURE — 77066 DX MAMMO INCL CAD BI: CPT | Mod: 26

## 2021-11-29 ENCOUNTER — NON-APPOINTMENT (OUTPATIENT)
Age: 71
End: 2021-11-29

## 2022-03-15 ENCOUNTER — APPOINTMENT (OUTPATIENT)
Dept: HEMATOLOGY ONCOLOGY | Facility: CLINIC | Age: 72
End: 2022-03-15
Payer: MEDICARE

## 2022-03-15 ENCOUNTER — LABORATORY RESULT (OUTPATIENT)
Age: 72
End: 2022-03-15

## 2022-03-15 VITALS
DIASTOLIC BLOOD PRESSURE: 81 MMHG | HEART RATE: 97 BPM | BODY MASS INDEX: 31.65 KG/M2 | SYSTOLIC BLOOD PRESSURE: 150 MMHG | HEIGHT: 65 IN | RESPIRATION RATE: 18 BRPM | TEMPERATURE: 96.9 F | WEIGHT: 190 LBS

## 2022-03-15 LAB
HCT VFR BLD CALC: 36.1 %
HGB BLD-MCNC: 12.2 G/DL
MCHC RBC-ENTMCNC: 30.1 PG
MCHC RBC-ENTMCNC: 33.8 G/DL
MCV RBC AUTO: 89.1 FL
PLATELET # BLD AUTO: 289 K/UL
PMV BLD: 9.1 FL
RBC # BLD: 4.05 M/UL
RBC # FLD: 13 %
WBC # FLD AUTO: 7.74 K/UL

## 2022-03-15 PROCEDURE — 99215 OFFICE O/P EST HI 40 MIN: CPT

## 2022-03-15 RX ORDER — TAMOXIFEN CITRATE 20 MG/1
20 TABLET, FILM COATED ORAL DAILY
Qty: 30 | Refills: 6 | Status: DISCONTINUED | COMMUNITY
Start: 2019-01-30 | End: 2022-03-15

## 2022-03-15 NOTE — ASSESSMENT
[Curative] : Goals of care discussed with patient: Curative [FreeTextEntry1] : #Left papillary carcinoma stage 0 TiCNXMX, ER/NH+, HER2-; s/p lumpectomy 2014 f/b RT and tamoxifen\par - previously suggested hormonal blockade for total of 10 years (started 2014). However she was having hot flashes almost every day and stopped after she completed the remainder of her script in 2019 but was lost to followup so she completed at 5 year sandra\par - had another discussion regarding recommendations for 10 years of therapy.  We will order Anastrazole 1mg daily.  Side effects discussed, written information given + Rx sent.  \par \par # left DCIS s/p lumpectomy 2014\par - dexa scan from 2019 reviewed ; normal density\par - c/w calcium + Vit D \par - mammogram bilateral from 11/2021 BI-RADS category 3: Probably Benign\par - followup with BREAST SURG, Dr. Short, as scheduled ; she is due for mammogram around May 2022\par - CBC, BMP, LFTs today\par \par seen/examined w/ NP Maricruz; note reviewed; case discussed; she returns afther the long delay; last visit, I recommended to continue hormonal blockade, she did not take it motivating someone in my office told her not to take it despite my recommendations; she did complete 5 years of tamoxifen in 2019; she is s/p hysterectomy and we could restart tamoxifen; however, given her post menopausal status, suggest to restart arimidex (AI);explained side effects; will plan to give it for 5 years pending the tolerance of potential side effects such as arthralgia and bone pain\par RTC in 3 weeks , sooner if needed to discuss how she tolerates a new medication

## 2022-03-15 NOTE — HISTORY OF PRESENT ILLNESS
[de-identified] : 67 yo woman comes for a follow up. She was diagnosed with left breast papillary carcinoma s/p resection in 2014 Stage O TisNXMX, ER/PA+,HER2-, f/b RT and hormonal blockade with tamoxifen. Repeat mammogram revealed an abnormality in 2014 and lumpectomy revealed left DCIS. She continued taking tamoxifen. \par \par FH : SISTER had breast cancer at the age of 30; father had lung cancer\par PMH: HTN, arthritis\par \par DEXA 2019: no evidence of osteopenia/osteoporosis [de-identified] : 08/20/2019\par Here for a scheduled follow up visit. \par Completed 5 years of tamoxifen. tolerated well but has bad hot flashes almost every day. Denies joint pains. \par Mammogram due in 2019 october\par \par 3/15/22\par Patient is here for a follow-up visit for DCIS.  She has not been seen in the clinic since 2019.  Back in 2019, she stopped Tamoxifen (after completing 5 years of therapy ; however, she was previously instructed to continue for a total of 10 year duration).  She states she previously tolerated Tamoxifen without significant issue (hot flashes) but did not have any medication remaining and failed to call or followup in the clinic.  She is feeling well with no new complaints.  Reviewed most recent mammography which is essentially stable.  Reviewed note from BREAST SURG, Dr. Short, which recommended repeat mammography around May 2022.  Patient denies fever, chills, nausea, vomiting, dyspnea or bleeding. \par Mammogram Bilateral (11.24.2021) Impression:Multiple stable subcentimeter oval circumscribed masses in the superior and lateral aspects of the right breast. Short interval follow-up recommended.Stable postoperative changes of the left breast. Recommendation: Follow-up unilateral right diagnostic mammogram in 6 months. BI-RADS category 3: Probably Benign

## 2022-03-15 NOTE — REVIEW OF SYSTEMS
[Joint Pain] : joint pain [Joint Stiffness] : joint stiffness [Negative] : Allergic/Immunologic [Fever] : no fever [Eye Pain] : no eye pain [Dysphagia] : no dysphagia [Chest Pain] : no chest pain [Shortness Of Breath] : no shortness of breath [Abdominal Pain] : no abdominal pain [Dysuria] : no dysuria [Muscle Pain] : no muscle pain [Muscle Weakness] : no muscle weakness [Confused] : no confusion [Dizziness] : no dizziness [Fainting] : no fainting [Difficulty Walking] : no difficulty walking

## 2022-03-16 LAB
ALBUMIN SERPL ELPH-MCNC: 5.4 G/DL
ALP BLD-CCNC: 117 U/L
ALT SERPL-CCNC: 21 U/L
ANION GAP SERPL CALC-SCNC: 15 MMOL/L
AST SERPL-CCNC: 21 U/L
BILIRUB DIRECT SERPL-MCNC: <0.2 MG/DL
BILIRUB INDIRECT SERPL-MCNC: >0.2 MG/DL
BILIRUB SERPL-MCNC: 0.4 MG/DL
BUN SERPL-MCNC: 22 MG/DL
CALCIUM SERPL-MCNC: 10.3 MG/DL
CHLORIDE SERPL-SCNC: 98 MMOL/L
CO2 SERPL-SCNC: 25 MMOL/L
CREAT SERPL-MCNC: 0.8 MG/DL
EGFR: 79 ML/MIN/1.73M2
GLUCOSE SERPL-MCNC: 80 MG/DL
POTASSIUM SERPL-SCNC: 4.4 MMOL/L
PROT SERPL-MCNC: 7.7 G/DL
SODIUM SERPL-SCNC: 138 MMOL/L

## 2022-03-29 ENCOUNTER — TRANSCRIPTION ENCOUNTER (OUTPATIENT)
Age: 72
End: 2022-03-29

## 2022-04-08 ENCOUNTER — OUTPATIENT (OUTPATIENT)
Dept: OUTPATIENT SERVICES | Facility: HOSPITAL | Age: 72
LOS: 1 days | Discharge: HOME | End: 2022-04-08

## 2022-04-08 ENCOUNTER — APPOINTMENT (OUTPATIENT)
Dept: HEMATOLOGY ONCOLOGY | Facility: CLINIC | Age: 72
End: 2022-04-08
Payer: MEDICARE

## 2022-04-08 VITALS
WEIGHT: 192 LBS | RESPIRATION RATE: 18 BRPM | OXYGEN SATURATION: 98 % | HEART RATE: 94 BPM | TEMPERATURE: 98.2 F | BODY MASS INDEX: 31.99 KG/M2 | SYSTOLIC BLOOD PRESSURE: 164 MMHG | DIASTOLIC BLOOD PRESSURE: 96 MMHG | HEIGHT: 65 IN

## 2022-04-08 DIAGNOSIS — D05.10 INTRADUCTAL CARCINOMA IN SITU OF UNSPECIFIED BREAST: ICD-10-CM

## 2022-04-08 PROCEDURE — 99214 OFFICE O/P EST MOD 30 MIN: CPT

## 2022-04-08 NOTE — ASSESSMENT
[Curative] : Goals of care discussed with patient: Curative [FreeTextEntry1] : # Left papillary carcinoma stage 0 TiCNXMX, ER/GA+, HER2-; s/p lumpectomy 2014 f/b RT and tamoxifen\par - previously suggested hormonal blockade for total of 10 years (started 2014). However she was having hot flashes almost every day and stopped after she completed the remainder of her script in 2019 but was lost to followup so she completed at 5 year sandra\par - c/w Anastrazole 1mg daily (resumed hormonal blockade therapy 03/2022) , recommendations for 10 years of therapy \par \par # Left DCIS s/p lumpectomy 2014\par - dexa scan from 2019 reviewed ; normal density\par - she is on Vit D supplementation \par - had a discussion regarding role of biphosphonate therapy such as Zometa monthly to start , she will require dental clearance, form provided\par - mammogram bilateral from 11/2021 BI-RADS category 3: Probably Benign\par - followup with BREAST SURG, Dr. Short, as scheduled ; she is due for mammogram around May 2022\par \par RTC in 3 months , sooner if needed\par seen/examined w/ NP Maricruz; note reviewed; case discussed; continue hormonal blockade; will need to repeat DEXA; advised to start bisphosphonates

## 2022-04-08 NOTE — HISTORY OF PRESENT ILLNESS
[de-identified] : 67 yo woman comes for a follow up. She was diagnosed with left breast papillary carcinoma s/p resection in 2014 Stage O TisNXMX, ER/WY+,HER2-, f/b RT and hormonal blockade with tamoxifen. Repeat mammogram revealed an abnormality in 2014 and lumpectomy revealed left DCIS. She continued taking tamoxifen. \par \par FH : SISTER had breast cancer at the age of 30; father had lung cancer\par PMH: HTN, arthritis\par \par DEXA 2019: no evidence of osteopenia/osteoporosis [de-identified] : 08/20/2019\par Here for a scheduled follow up visit. \par Completed 5 years of tamoxifen. tolerated well but has bad hot flashes almost every day. Denies joint pains. \par Mammogram due in 2019 october\par \par 3/15/22\par Patient is here for a follow-up visit for DCIS.  She has not been seen in the clinic since 2019.  Back in 2019, she stopped Tamoxifen (after completing 5 years of therapy ; however, she was previously instructed to continue for a total of 10 year duration).  She states she previously tolerated Tamoxifen without significant issue (hot flashes) but did not have any medication remaining and failed to call or followup in the clinic.  She is feeling well with no new complaints.  Reviewed most recent mammography which is essentially stable.  Reviewed note from BREAST SURG, Dr. Short, which recommended repeat mammography around May 2022.  Patient denies fever, chills, nausea, vomiting, dyspnea or bleeding. \par Mammogram Bilateral (11.24.2021) Impression:Multiple stable subcentimeter oval circumscribed masses in the superior and lateral aspects of the right breast. Short interval follow-up recommended.Stable postoperative changes of the left breast. Recommendation: Follow-up unilateral right diagnostic mammogram in 6 months. BI-RADS category 3: Probably Benign\par \par 4/8/22\par Patient is here for a follow-up visit for Left papillary carcinoma + DCIS.  Since last visit, she resumed hormonal blockade therapy, started Anastrazole once daily about 3 weeks ago.  She reports tolerating AI fine.  She is feeling well with no new complaints.  Patient denies fever, chills, nausea, vomiting, dyspnea or bleeding.

## 2022-04-19 ENCOUNTER — APPOINTMENT (OUTPATIENT)
Dept: BREAST CENTER | Facility: CLINIC | Age: 72
End: 2022-04-19
Payer: MEDICARE

## 2022-04-19 DIAGNOSIS — Z80.3 FAMILY HISTORY OF MALIGNANT NEOPLASM OF BREAST: ICD-10-CM

## 2022-04-19 DIAGNOSIS — R92.8 OTHER ABNORMAL AND INCONCLUSIVE FINDINGS ON DIAGNOSTIC IMAGING OF BREAST: ICD-10-CM

## 2022-04-19 PROCEDURE — 99203 OFFICE O/P NEW LOW 30 MIN: CPT

## 2022-04-19 NOTE — HISTORY OF PRESENT ILLNESS
[FreeTextEntry1] : h/o right breast benign lumpectomy [facility].\par \par FHx breast cancer - sister at 30, 47 BRCA (-).\par \par Left papilloma/ADH on Stereo 4/3/14; LUOQ, anterior, 8 mm.\par s/p Left NLOC 8/11/14 - 5 mm solid papillary carcinoma, intermed grade, (-) margins. Sclerosing intraductal \par papilloma, ADH. ER/UT (+).\par (+) whole breast RT. Was on Tamoxifen w/ Dr. Becker.\par \par Left intraductal papilloma on Stereo 12/23/15; LLIQ ([place]).\par s/p Left NLOC - 02/26/16 = Healing prior biopsy site with no residual intraductal papillary lesion identified. \par \par \par GISEL PALMA is a 71 year old female patient who presents today to re-establish care with a breast \par surgeon.\par She has met with her oncologist, Dr. Sanders recently and he has restarted her on adjuvant hormonal therapy with Anastrozole as of March 2022.  She is tolerating it well. \par \par She has no breast related complaints at this time.  She denies any breast pain, has not palpated any masses in either breast and denies any nipple discharge or retraction.\par \par Her most recent imaging:\par B/L Dx Mammo & Sono - 11/24/2021:\par -The breasts are heterogeneously dense.\par -Stable multiple subcentimeter oval circumscribed masses in the superior and lateral aspect of the right breast. \par -Stable biopsy-proven benign 1.5 cm mass associated with coarse calcifications within the medial right breast. (This corresponds to sonographically biopsied fibroadenoma).\par -Stable area of architectural distortion at the site of lumpectomy seen in the lateral left breast. \par -Stable area of benign architectural distortion corresponding to the site of surgery within the medial left breast.\par Bilateral whole Breast Ultrasound:\par -At 1:00 N8 there is redemonstration of biopsy proven benign mass (fibroadenoma) measuring 14 x 6 x 14 mm.\par -Postoperative changes are noted in the left upper outer quadrant at 3:00 9 cm from the nipple. \par BI-RADS category 3: Probably Benign\par \par \par She presents today for evaluation and imaging review.

## 2022-04-19 NOTE — REASON FOR VISIT
[Initial Evaluation] : an initial evaluation [FreeTextEntry1] : Left breast cancer (2014); BIRADS-3 imaging.

## 2022-04-19 NOTE — PHYSICAL EXAM
[Normocephalic] : normocephalic [Atraumatic] : atraumatic [No Supraclavicular Adenopathy] : no supraclavicular adenopathy [No dominant masses] : no dominant masses in right breast  [No dominant masses] : no dominant masses left breast [No Nipple Discharge] : no left nipple discharge [No Rashes] : no rashes [No Ulceration] : no ulceration [Breast Nipple Inversion] : nipples not inverted [Breast Nipple Retraction] : nipples not retracted [de-identified] : B/L nondiscrete nodularities, well healed left breast surgical incisions. [de-identified] : No axillary lymphadenopathy appreciated. [de-identified] : No axillary lymphadenopathy appreciated.

## 2022-04-19 NOTE — PAST MEDICAL HISTORY
[Surgical Menopause] : The patient is in surgical menopause [Menarche Age ____] : age at menarche was [unfilled] [Menopause Age____] : age at menopause was [unfilled] [Total Preg ___] : G[unfilled] [Live Births ___] : P[unfilled]  [Age At Live Birth ___] : Age at live birth: [unfilled] [History of Hormone Replacement Treatment] : has no history of hormone replacement treatment [FreeTextEntry6] : No. [FreeTextEntry7] : No. [FreeTextEntry8] : No.

## 2022-04-19 NOTE — DATA REVIEWED
[FreeTextEntry1] : B/L Dx Mammo & Sono - 11/24/2021:\par Breast composition: The breasts are heterogeneously dense, which may obscure small masses.\par \par Findings:\par \par Mammogram:\par \par Stable multiple subcentimeter oval circumscribed masses in the superior and lateral aspect of the right breast. Short interval follow-up recommended.\par \par Stable biopsy-proven benign 1.5 cm mass associated with coarse calcifications within the medial right breast. This corresponds to sonographically biopsied fibroadenoma\par \par Stable area of architectural distortion at the site of lumpectomy seen in the lateral left breast. Stable area of benign architectural distortion corresponding to the site of surgery within the medial left breast.\par \par No suspicious mass, micro-calcification, or architectural distortion within the bilateral breasts.\par \par Bilateral whole Breast Ultrasound:\par \par At 1:00 N8 there is redemonstration of biopsy proven benign mass (fibroadenoma) measuring 14 x 6 x 14 mm.\par Postoperative changes are noted in the left upper outer quadrant at 3:00 9 cm from the nipple. No suspicious mass or cyst is identified in either breast or axilla.\par \par \par Impression:\par \par \par Multiple stable subcentimeter oval circumscribed masses in the superior and lateral aspects of the right breast. Short interval follow-up recommended.\par \par Stable postoperative changes of the left breast\par \par Recommendation: Follow-up unilateral right diagnostic mammogram in 6 months.\par \par BI-RADS category 3: Probably Benign\par

## 2022-04-19 NOTE — ASSESSMENT
[FreeTextEntry1] : GISEL PALMA is a 71 year old female patient who presents today to re-establish care with a breast \par surgeon.\par She has met with her oncologist, Dr. Sanders recently and he has restarted her on adjuvant hormonal therapy with Anastrozole as of March 2022.  She is tolerating it well. \par \par She has no breast related complaints at this time.  She denies any breast pain, has not palpated any masses in either breast and denies any nipple discharge or retraction.\par \par Her most recent imaging:\par B/L Dx Mammo & Sono - 11/24/2021:\par -The breasts are heterogeneously dense.\par -Stable multiple subcentimeter oval circumscribed masses in the superior and lateral aspect of the right breast. \par -Stable biopsy-proven benign 1.5 cm mass associated with coarse calcifications within the medial right breast. (This corresponds to sonographically biopsied fibroadenoma).\par -Stable area of architectural distortion at the site of lumpectomy seen in the lateral left breast. \par -Stable area of benign architectural distortion corresponding to the site of surgery within the medial left breast.\par Bilateral whole Breast Ultrasound:\par -At 1:00 N8 there is redemonstration of biopsy proven benign mass (fibroadenoma) measuring 14 x 6 x 14 mm.\par -Postoperative changes are noted in the left upper outer quadrant at 3:00 9 cm from the nipple. \par BI-RADS category 3: Probably Benign\par \par Physical exam today, B/L nondiscrete nodularities, well healed left breast surgical incisions.\par \par We discussed BIRADS 3 lesions. These lesions have a 2% chance of harboring malignancy. There is always an option to obtain a tissue sample with a biopsy to confirm the diagnosis. The procedure was described in detail including the placement of a tissue marker clip. The risks of the procedure, including but not limited to bleeding and infection were also explained. She is not interested in biopsy at this time and agrees to continue with short-term interval imaging, which is traditionally performed at six-month intervals for approximately two years to establish stability.\par \par PLAN:\par -Right Uni Dx Mammo & Sono - May 2022.\par -Will plan on discussing results via telephone.\par -If benign, B/L Dx Mammo & Sono - November 2022.\par -f/u after.

## 2022-05-16 ENCOUNTER — OUTPATIENT (OUTPATIENT)
Dept: OUTPATIENT SERVICES | Facility: HOSPITAL | Age: 72
LOS: 1 days | Discharge: HOME | End: 2022-05-16
Payer: MEDICARE

## 2022-05-16 ENCOUNTER — NON-APPOINTMENT (OUTPATIENT)
Age: 72
End: 2022-05-16

## 2022-05-16 ENCOUNTER — RESULT REVIEW (OUTPATIENT)
Age: 72
End: 2022-05-16

## 2022-05-16 DIAGNOSIS — R92.8 OTHER ABNORMAL AND INCONCLUSIVE FINDINGS ON DIAGNOSTIC IMAGING OF BREAST: ICD-10-CM

## 2022-05-16 PROCEDURE — 76641 ULTRASOUND BREAST COMPLETE: CPT | Mod: 26,RT

## 2022-05-16 PROCEDURE — 77065 DX MAMMO INCL CAD UNI: CPT | Mod: 26,RT

## 2022-05-16 PROCEDURE — G0279: CPT | Mod: 26

## 2022-05-17 DIAGNOSIS — N95.9 UNSPECIFIED MENOPAUSAL AND PERIMENOPAUSAL DISORDER: ICD-10-CM

## 2022-05-17 DIAGNOSIS — Z13.820 ENCOUNTER FOR SCREENING FOR OSTEOPOROSIS: ICD-10-CM

## 2022-07-27 ENCOUNTER — LABORATORY RESULT (OUTPATIENT)
Age: 72
End: 2022-07-27

## 2022-07-27 ENCOUNTER — APPOINTMENT (OUTPATIENT)
Dept: HEMATOLOGY ONCOLOGY | Facility: CLINIC | Age: 72
End: 2022-07-27

## 2022-07-27 DIAGNOSIS — Z00.00 ENCOUNTER FOR GENERAL ADULT MEDICAL EXAMINATION W/OUT ABNORMAL FINDINGS: ICD-10-CM

## 2022-07-27 LAB
ALBUMIN SERPL ELPH-MCNC: 5 G/DL
ALP BLD-CCNC: 108 U/L
ALT SERPL-CCNC: 21 U/L
ANION GAP SERPL CALC-SCNC: 12 MMOL/L
AST SERPL-CCNC: 23 U/L
BILIRUB DIRECT SERPL-MCNC: <0.2 MG/DL
BILIRUB INDIRECT SERPL-MCNC: >0.2 MG/DL
BILIRUB SERPL-MCNC: 0.4 MG/DL
BUN SERPL-MCNC: 27 MG/DL
CALCIUM SERPL-MCNC: 10.1 MG/DL
CHLORIDE SERPL-SCNC: 101 MMOL/L
CO2 SERPL-SCNC: 26 MMOL/L
CREAT SERPL-MCNC: 0.9 MG/DL
EGFR: 68 ML/MIN/1.73M2
GLUCOSE SERPL-MCNC: 105 MG/DL
HCT VFR BLD CALC: 36.8 %
HGB BLD-MCNC: 12.3 G/DL
MCHC RBC-ENTMCNC: 29.9 PG
MCHC RBC-ENTMCNC: 33.4 G/DL
MCV RBC AUTO: 89.3 FL
PLATELET # BLD AUTO: 255 K/UL
PMV BLD: 9.2 FL
POTASSIUM SERPL-SCNC: 4.6 MMOL/L
PROT SERPL-MCNC: 7.5 G/DL
RBC # BLD: 4.12 M/UL
RBC # FLD: 12.6 %
SODIUM SERPL-SCNC: 139 MMOL/L
WBC # FLD AUTO: 5.86 K/UL

## 2022-08-17 ENCOUNTER — OUTPATIENT (OUTPATIENT)
Dept: OUTPATIENT SERVICES | Facility: HOSPITAL | Age: 72
LOS: 1 days | Discharge: HOME | End: 2022-08-17

## 2022-08-17 ENCOUNTER — APPOINTMENT (OUTPATIENT)
Dept: HEMATOLOGY ONCOLOGY | Facility: CLINIC | Age: 72
End: 2022-08-17

## 2022-08-17 VITALS
SYSTOLIC BLOOD PRESSURE: 153 MMHG | BODY MASS INDEX: 31.65 KG/M2 | WEIGHT: 190 LBS | TEMPERATURE: 98.2 F | HEART RATE: 101 BPM | DIASTOLIC BLOOD PRESSURE: 80 MMHG | RESPIRATION RATE: 18 BRPM | HEIGHT: 65 IN | OXYGEN SATURATION: 100 %

## 2022-08-17 DIAGNOSIS — D05.10 INTRADUCTAL CARCINOMA IN SITU OF UNSPECIFIED BREAST: ICD-10-CM

## 2022-08-17 PROCEDURE — 99214 OFFICE O/P EST MOD 30 MIN: CPT

## 2022-08-17 NOTE — ASSESSMENT
[Curative] : Goals of care discussed with patient: Curative [FreeTextEntry1] : # Left papillary carcinoma stage 0 TiCNXMX, ER/NV+, HER2-; s/p lumpectomy 2014 f/b RT and tamoxifen\par - previously suggested hormonal blockade for total of 10 years (started 2014). However she was having hot flashes almost every day and stopped after she completed the remainder of her script in 2019 but was lost to followup so she completed at 5 year sandra\par - c/w Anastrazole 1mg daily (resumed hormonal blockade therapy 03/2022) , recommendations for 10 years of therapy \par \par # Left DCIS s/p lumpectomy 2014\par - dexa scan from May 2022 reviewed ; normal density\par - she is on Vit D/Ca supplementation \par - had a discussion regarding role of biphosphonate therapy such as Zometa monthly or Denosumab q6 months to start , she will require dental clearance, form provided. She is not interested in staring it as of now.\par - mammogram bilateral from 05/2022 BI-RADS category 3: Probably Benign\par - followup with BREAST SURG, Dr. Das, as scheduled ; she is due for mammogram around Nov 2022\par \par seen/examined w/  hemon fellow; note reviewed; case discussed\par continue breast exams/ breast clinic\par mammogram as scheduled\par hormonal therapy\par

## 2022-08-17 NOTE — HISTORY OF PRESENT ILLNESS
[de-identified] : 67 yo woman comes for a follow up. She was diagnosed with left breast papillary carcinoma s/p resection in 2014 Stage O TisNXMX, ER/OK+,HER2-, f/b RT and hormonal blockade with tamoxifen. Repeat mammogram revealed an abnormality in 2014 and lumpectomy revealed left DCIS. She continued taking tamoxifen. \par \par FH : SISTER had breast cancer at the age of 30; father had lung cancer\par PMH: HTN, arthritis\par \par DEXA 2019: no evidence of osteopenia/osteoporosis\par \par Dexa Scan 2022 : Negative for osteopenia/osteoporosis [de-identified] : 08/20/2019\par Here for a scheduled follow up visit. \par Completed 5 years of tamoxifen. tolerated well but has bad hot flashes almost every day. Denies joint pains. \par Mammogram due in 2019 october\par \par 3/15/22\par Patient is here for a follow-up visit for DCIS.  She has not been seen in the clinic since 2019.  Back in 2019, she stopped Tamoxifen (after completing 5 years of therapy ; however, she was previously instructed to continue for a total of 10 year duration).  She states she previously tolerated Tamoxifen without significant issue (hot flashes) but did not have any medication remaining and failed to call or followup in the clinic.  She is feeling well with no new complaints.  Reviewed most recent mammography which is essentially stable.  Reviewed note from BREAST SURG, Dr. Short, which recommended repeat mammography around May 2022.  Patient denies fever, chills, nausea, vomiting, dyspnea or bleeding. \par Mammogram Bilateral (11.24.2021) Impression:Multiple stable subcentimeter oval circumscribed masses in the superior and lateral aspects of the right breast. Short interval follow-up recommended.Stable postoperative changes of the left breast. Recommendation: Follow-up unilateral right diagnostic mammogram in 6 months. BI-RADS category 3: Probably Benign\par \par 4/8/22\par Patient is here for a follow-up visit for Left papillary carcinoma + DCIS.  Since last visit, she resumed hormonal blockade therapy, started Anastrazole once daily about 3 weeks ago.  She reports tolerating AI fine.  She is feeling well with no new complaints.  Patient denies fever, chills, nausea, vomiting, dyspnea or bleeding.\par \par 8/17/2022\par Patient is here for 3 months follow up. She feels well and she has no complaints or concerns. She denies any changes in weight or bone pains. She is following up with Dr. Das (breast surgeon). She completed mammo and Dexa scan in the interim.

## 2022-08-18 DIAGNOSIS — Z85.3 PERSONAL HISTORY OF MALIGNANT NEOPLASM OF BREAST: ICD-10-CM

## 2022-11-15 ENCOUNTER — RESULT REVIEW (OUTPATIENT)
Age: 72
End: 2022-11-15

## 2022-11-15 ENCOUNTER — OUTPATIENT (OUTPATIENT)
Dept: OUTPATIENT SERVICES | Facility: HOSPITAL | Age: 72
LOS: 1 days | Discharge: HOME | End: 2022-11-15

## 2022-11-15 DIAGNOSIS — R92.8 OTHER ABNORMAL AND INCONCLUSIVE FINDINGS ON DIAGNOSTIC IMAGING OF BREAST: ICD-10-CM

## 2022-11-15 PROCEDURE — G0279: CPT | Mod: 26

## 2022-11-15 PROCEDURE — 77066 DX MAMMO INCL CAD BI: CPT | Mod: 26

## 2022-11-15 PROCEDURE — 76641 ULTRASOUND BREAST COMPLETE: CPT | Mod: 26,50

## 2022-11-30 ENCOUNTER — APPOINTMENT (OUTPATIENT)
Dept: BREAST CENTER | Facility: CLINIC | Age: 72
End: 2022-11-30

## 2022-11-30 VITALS
HEIGHT: 65 IN | WEIGHT: 185 LBS | DIASTOLIC BLOOD PRESSURE: 83 MMHG | SYSTOLIC BLOOD PRESSURE: 147 MMHG | BODY MASS INDEX: 30.82 KG/M2

## 2022-11-30 PROCEDURE — 99212 OFFICE O/P EST SF 10 MIN: CPT

## 2022-11-30 NOTE — DATA REVIEWED
[FreeTextEntry1] : ACC: [id number]     EXAM:  MG US BREAST COMPLETE BI\par ACC: [id number]     EXAM:  MG MAMMO DIAG W MIYA BI#\par \par PROCEDURE DATE:  11/15/2022\par \par \par \par INTERPRETATION:  Clinical History / Reason for exam: Follow-up right breast mass.\par \par The patient reports last clinical breast examination was performed about: Within the past year.\par \par Family history of breast cancer: Sister at age 30.\par \par Comparisons: Mammogram dating back to 2018. Breast ultrasound dating back to 2020.\par \par Views obtained: bilateral full field digital 2D and digital tomosynthesis images .\par \par Computer-aided detection was utilized in the interpretation of this examination.\par \par Breast composition: The breasts are heterogeneously dense, which may obscure small masses.\par \par Findings:\par \par Mammogram:\par \par No suspicious mass, microcalcifications or areas of architectural distortion seen in either breast. Stable right breast masses. These masses have been stable since at least 2020 suggesting benign etiology.\par \par Ultrasound:\par \par Bilateral whole breast ultrasound was performed.\par At 1:00 N8 of the right breast there is a stable previously biopsied benign hypoechoic mass measuring 1.4 x 0.9 x 1.4 cm.\par There is no other solid or cystic mass noted in either breast. No right or left axillary lymphadenopathy.\par \par Impression: No mammographic or sonographic evidence of malignancy.\par \par Recommendation: Unless otherwise indicated by clinical findings, annual screening mammography recommended.\par \par BI-RADS Category 2: Benign\par

## 2022-11-30 NOTE — HISTORY OF PRESENT ILLNESS
[FreeTextEntry1] : h/o right breast benign lumpectomy [facility].\par \par FHx breast cancer - sister at 30, 47 BRCA (-).\par \par Left papilloma/ADH on Stereo 4/3/14; LUOQ, anterior, 8 mm.\par s/p Left NLOC 8/11/14 - 5 mm solid papillary carcinoma, intermed grade, (-) margins. Sclerosing intraductal \par papilloma, ADH. ER/CO (+).\par (+) whole breast RT. Was on Tamoxifen w/ Dr. Becker.\par \par Left intraductal papilloma on Stereo 12/23/15; LLIQ ([place]).\par s/p Left NLOC - 02/26/16 = Healing prior biopsy site with no residual intraductal papillary lesion identified. \par \par \par ALYSE PALMA is a 71 year old female patient who presents today to re-establish care with a breast \par surgeon.\par She has met with her oncologist, Dr. Sanders recently and he has restarted her on adjuvant hormonal therapy with Anastrozole as of March 2022.  She is tolerating it well. \par \par She has no breast related complaints at this time.  She denies any breast pain, has not palpated any masses in either breast and denies any nipple discharge or retraction.\par \par Her most recent imaging:\par B/L Dx Mammo & Sono - 11/24/2021:\par -The breasts are heterogeneously dense.\par -Stable multiple subcentimeter oval circumscribed masses in the superior and lateral aspect of the right breast. \par -Stable biopsy-proven benign 1.5 cm mass associated with coarse calcifications within the medial right breast. (This corresponds to sonographically biopsied fibroadenoma).\par -Stable area of architectural distortion at the site of lumpectomy seen in the lateral left breast. \par -Stable area of benign architectural distortion corresponding to the site of surgery within the medial left breast.\par Bilateral whole Breast Ultrasound:\par -At 1:00 N8 there is redemonstration of biopsy proven benign mass (fibroadenoma) measuring 14 x 6 x 14 mm.\par -Postoperative changes are noted in the left upper outer quadrant at 3:00 9 cm from the nipple. \par BI-RADS category 3: Probably Benign\par \par \par INTERVAL HISTORY 11/30/22\par Alyse is here for her six months follow up visit \par She has no breast related complaints at this time.  She denies any breast pain, has not palpated any new palpable masses in either breast and denies any nipple discharge or retraction.\par \par Her imaging is as follows:\par 11/15/2022 b/l dx mammo and US\par -breasts are heterogeneously dense\par -Stable right breast masses--> stable since at least 2020 suggesting benign etiology.\par \par \par RIGHT US:\par -@1:00 N8 stable previously biopsied benign hypoechoic mass measuring 1.4 x 0.9 x 1.4 cm.\par BIRADS2

## 2022-11-30 NOTE — PHYSICAL EXAM
[Normocephalic] : normocephalic [Atraumatic] : atraumatic [EOMI] : extra ocular movement intact [Examined in the supine and seated position] : examined in the supine and seated position [Symmetrical] : symmetrical [No dominant masses] : no dominant masses in right breast  [No dominant masses] : no dominant masses left breast [No Nipple Retraction] : no right nipple retraction [No Nipple Discharge] : no left nipple discharge [No Axillary Lymphadenopathy] : no left axillary lymphadenopathy [No Edema] : no edema [No Rashes] : no rashes [No Ulceration] : no ulceration [de-identified] : no suspicious abnormalities were palpated although she does have bilateral nondiscrete nodularities throughout both breasts. Left breast chronic retracted nipple \par

## 2022-11-30 NOTE — ASSESSMENT
[FreeTextEntry1] : GISEL PALMA is a 72 year old female patient with hx of left breast cancer. \par She has met with her oncologist, Dr. Sanders recently and he has restarted her on adjuvant hormonal therapy with Anastrozole as of March 2022.  She is tolerating it well. \par \par She has no breast related complaints at this time.  She denies any breast pain, has not palpated any masses in either breast and denies any nipple discharge or retraction.\par \par Her imaging is as follows:\par 11/15/2022 b/l dx mammo and US\par -breasts are heterogeneously dense\par -Stable right breast masses--> stable since at least 2020 suggesting benign etiology.\par \par RIGHT US:\par -@1:00 N8 stable previously biopsied benign hypoechoic mass measuring 1.4 x 0.9 x 1.4 cm.\par BIRADS2\par \par On exam, no suspicious abnormalities were palpated although she does have bilateral nondiscrete nodularities throughout both breasts. Left breast chronic retracted nipple \par \par We discussed dense breasts.  Increasing breast density has been found to increase ones risk of breast cancer, but at this time, there is no clear indication for additional imaging in this setting, as both US and MRI have not been found to improve survival.  One can consider bilateral screening US.  However, out of 1000 women screened, the use of routine US will only identify an additional 3-5 cancers.  The use of US was found to increase the likelihood of undergoing more imaging and more biopsies.  She does have dense breasts.  We have decided to proceed with screening bilateral breast US at this time.  This will be scheduled with her next screening mammogram.\par \par \par PLAN:\par -B/L mammo and  US on 11/16/23\par -f/u after.

## 2023-02-06 ENCOUNTER — OUTPATIENT (OUTPATIENT)
Dept: OUTPATIENT SERVICES | Facility: HOSPITAL | Age: 73
LOS: 1 days | End: 2023-02-06
Payer: MEDICARE

## 2023-02-06 ENCOUNTER — APPOINTMENT (OUTPATIENT)
Dept: HEMATOLOGY ONCOLOGY | Facility: CLINIC | Age: 73
End: 2023-02-06

## 2023-02-06 ENCOUNTER — LABORATORY RESULT (OUTPATIENT)
Age: 73
End: 2023-02-06

## 2023-02-06 DIAGNOSIS — Z85.3 PERSONAL HISTORY OF MALIGNANT NEOPLASM OF BREAST: ICD-10-CM

## 2023-02-06 LAB
HCT VFR BLD CALC: 35.6 %
HGB BLD-MCNC: 11.9 G/DL
MCHC RBC-ENTMCNC: 30 PG
MCHC RBC-ENTMCNC: 33.4 G/DL
MCV RBC AUTO: 89.7 FL
PLATELET # BLD AUTO: 294 K/UL
PMV BLD: 9.7 FL
RBC # BLD: 3.97 M/UL
RBC # FLD: 12.4 %
WBC # FLD AUTO: 6.28 K/UL

## 2023-02-06 PROCEDURE — 80048 BASIC METABOLIC PNL TOTAL CA: CPT

## 2023-02-06 PROCEDURE — 85027 COMPLETE CBC AUTOMATED: CPT

## 2023-02-06 PROCEDURE — 80076 HEPATIC FUNCTION PANEL: CPT

## 2023-02-07 DIAGNOSIS — Z85.3 PERSONAL HISTORY OF MALIGNANT NEOPLASM OF BREAST: ICD-10-CM

## 2023-02-07 LAB
ALBUMIN SERPL ELPH-MCNC: 4.8 G/DL
ALP BLD-CCNC: 120 U/L
ALT SERPL-CCNC: 20 U/L
ANION GAP SERPL CALC-SCNC: 10 MMOL/L
AST SERPL-CCNC: 18 U/L
BILIRUB DIRECT SERPL-MCNC: <0.2 MG/DL
BILIRUB INDIRECT SERPL-MCNC: >0.2 MG/DL
BILIRUB SERPL-MCNC: 0.4 MG/DL
BUN SERPL-MCNC: 26 MG/DL
CALCIUM SERPL-MCNC: 10.1 MG/DL
CHLORIDE SERPL-SCNC: 103 MMOL/L
CO2 SERPL-SCNC: 30 MMOL/L
CREAT SERPL-MCNC: 0.8 MG/DL
EGFR: 79 ML/MIN/1.73M2
GLUCOSE SERPL-MCNC: 94 MG/DL
POTASSIUM SERPL-SCNC: 4.1 MMOL/L
PROT SERPL-MCNC: 7.5 G/DL
SODIUM SERPL-SCNC: 143 MMOL/L

## 2023-02-14 ENCOUNTER — APPOINTMENT (OUTPATIENT)
Dept: HEMATOLOGY ONCOLOGY | Facility: CLINIC | Age: 73
End: 2023-02-14
Payer: MEDICARE

## 2023-02-14 ENCOUNTER — APPOINTMENT (OUTPATIENT)
Dept: HEMATOLOGY ONCOLOGY | Facility: CLINIC | Age: 73
End: 2023-02-14

## 2023-02-14 ENCOUNTER — OUTPATIENT (OUTPATIENT)
Dept: OUTPATIENT SERVICES | Facility: HOSPITAL | Age: 73
LOS: 1 days | End: 2023-02-14
Payer: MEDICARE

## 2023-02-14 ENCOUNTER — LABORATORY RESULT (OUTPATIENT)
Age: 73
End: 2023-02-14

## 2023-02-14 VITALS
SYSTOLIC BLOOD PRESSURE: 143 MMHG | HEIGHT: 65 IN | OXYGEN SATURATION: 98 % | WEIGHT: 184 LBS | BODY MASS INDEX: 30.66 KG/M2 | TEMPERATURE: 98.4 F | DIASTOLIC BLOOD PRESSURE: 68 MMHG | HEART RATE: 107 BPM

## 2023-02-14 DIAGNOSIS — Z85.3 PERSONAL HISTORY OF MALIGNANT NEOPLASM OF BREAST: ICD-10-CM

## 2023-02-14 LAB
HCT VFR BLD CALC: 35.4 %
HGB BLD-MCNC: 11.7 G/DL
IRON SATN MFR SERPL: 34 %
IRON SERPL-MCNC: 104 UG/DL
MCHC RBC-ENTMCNC: 29.7 PG
MCHC RBC-ENTMCNC: 33.1 G/DL
MCV RBC AUTO: 89.8 FL
PLATELET # BLD AUTO: 261 K/UL
PMV BLD: 9.9 FL
RBC # BLD: 3.94 M/UL
RBC # FLD: 12.1 %
TIBC SERPL-MCNC: 302 UG/DL
UIBC SERPL-MCNC: 198 UG/DL
WBC # FLD AUTO: 6.5 K/UL

## 2023-02-14 PROCEDURE — 99214 OFFICE O/P EST MOD 30 MIN: CPT

## 2023-02-14 PROCEDURE — 85027 COMPLETE CBC AUTOMATED: CPT

## 2023-02-14 PROCEDURE — 36415 COLL VENOUS BLD VENIPUNCTURE: CPT

## 2023-02-14 PROCEDURE — 82746 ASSAY OF FOLIC ACID SERUM: CPT

## 2023-02-14 PROCEDURE — 83921 ORGANIC ACID SINGLE QUANT: CPT

## 2023-02-14 PROCEDURE — 83550 IRON BINDING TEST: CPT

## 2023-02-14 PROCEDURE — 82607 VITAMIN B-12: CPT

## 2023-02-14 PROCEDURE — 83540 ASSAY OF IRON: CPT

## 2023-02-14 NOTE — HISTORY OF PRESENT ILLNESS
[de-identified] : 69 yo woman comes for a follow up. She was diagnosed with left breast papillary carcinoma s/p resection in 2014 Stage O TisNXMX, ER/LA+,HER2-, f/b RT and hormonal blockade with tamoxifen. Repeat mammogram revealed an abnormality in 2014 and lumpectomy revealed left DCIS. She continued taking tamoxifen. \par \par FH : SISTER had breast cancer at the age of 30; father had lung cancer\par PMH: HTN, arthritis\par \par DEXA 2019: no evidence of osteopenia/osteoporosis\par \par Dexa Scan 2022 : Negative for osteopenia/osteoporosis [de-identified] : 08/20/2019\par Here for a scheduled follow up visit. \par Completed 5 years of tamoxifen. tolerated well but has bad hot flashes almost every day. Denies joint pains. \par Mammogram due in 2019 october\par \par 3/15/22\par Patient is here for a follow-up visit for DCIS.  She has not been seen in the clinic since 2019.  Back in 2019, she stopped Tamoxifen (after completing 5 years of therapy ; however, she was previously instructed to continue for a total of 10 year duration).  She states she previously tolerated Tamoxifen without significant issue (hot flashes) but did not have any medication remaining and failed to call or followup in the clinic.  She is feeling well with no new complaints.  Reviewed most recent mammography which is essentially stable.  Reviewed note from BREAST SURG, Dr. Short, which recommended repeat mammography around May 2022.  Patient denies fever, chills, nausea, vomiting, dyspnea or bleeding. \par Mammogram Bilateral (11.24.2021) Impression:Multiple stable subcentimeter oval circumscribed masses in the superior and lateral aspects of the right breast. Short interval follow-up recommended.Stable postoperative changes of the left breast. Recommendation: Follow-up unilateral right diagnostic mammogram in 6 months. BI-RADS category 3: Probably Benign\par \par 4/8/22\par Patient is here for a follow-up visit for Left papillary carcinoma + DCIS.  Since last visit, she resumed hormonal blockade therapy, started Anastrazole once daily about 3 weeks ago.  She reports tolerating AI fine.  She is feeling well with no new complaints.  Patient denies fever, chills, nausea, vomiting, dyspnea or bleeding.\par \par 8/17/2022\par Patient is here for 3 months follow up. She feels well and she has no complaints or concerns. She denies any changes in weight or bone pains. She is following up with Dr. Das (breast surgeon). She completed mammo and Dexa scan in the interim. \par \par 2/14/23\par

## 2023-02-14 NOTE — ASSESSMENT
[Curative] : Goals of care discussed with patient: Curative [FreeTextEntry1] : # Left papillary carcinoma stage 0 TiCNXMX, ER/OR+, HER2-; s/p lumpectomy 2014 f/b RT and tamoxifen\par - previously suggested hormonal blockade for total of 10 years (started 2014). However she was having hot flashes almost every day and stopped after she completed the remainder of her script in 2019 but was lost to followup so she completed at 5 year sandra\par - c/w Anastrazole 1mg daily (resumed hormonal blockade therapy 03/2022) , recommendations for 10 years of therapy \par \par # Left DCIS s/p lumpectomy 2014\par - dexa scan from May 2022 reviewed ; normal density\par - she is on Vit D/Ca supplementation \par - had a discussion regarding role of biphosphonate therapy such as Zometa monthly or Denosumab q6 months to start , she will require dental clearance, form provided. She is not interested in staring it as of now.\par - mammogram bilateral from 05/2022 BI-RADS category 3: Probably Benign\par - followup with BREAST SURG, Dr. Das, as scheduled ; she is due for mammogram around Nov 2022\par \par seen/examined w/  hemonc fellow; note reviewed; case discussed\par continue breast exams/ breast clinic\par mammogram as scheduled\par hormonal therapy\par started on iron tabs w/o checkng iron levels; recommend to hold iron and check iron leveles\par rtc in 6 mos: if labs are normal then, will follow annually\par

## 2023-02-15 LAB
FOLATE SERPL-MCNC: >20 NG/ML
VIT B12 SERPL-MCNC: 425 PG/ML

## 2023-02-16 ENCOUNTER — APPOINTMENT (OUTPATIENT)
Dept: ORTHOPEDIC SURGERY | Facility: CLINIC | Age: 73
End: 2023-02-16
Payer: MEDICARE

## 2023-02-16 DIAGNOSIS — M17.0 BILATERAL PRIMARY OSTEOARTHRITIS OF KNEE: ICD-10-CM

## 2023-02-16 PROCEDURE — 99204 OFFICE O/P NEW MOD 45 MIN: CPT

## 2023-02-16 PROCEDURE — 73562 X-RAY EXAM OF KNEE 3: CPT | Mod: 50

## 2023-02-16 RX ORDER — DICLOFENAC SODIUM 75 MG/1
75 TABLET, DELAYED RELEASE ORAL
Qty: 60 | Refills: 0 | Status: ACTIVE | COMMUNITY
Start: 2023-02-16 | End: 1900-01-01

## 2023-02-16 NOTE — HISTORY OF PRESENT ILLNESS
[de-identified] : Patient here for evaluation bilateral knee pain.\par Denies instability\par Pain with ambulation and stairs\par \par left worse than right\par \par NAD\par bilateral knee\par No skin breakdown\par Tricompartmental TTP\par Positive patella grind\par PF crepitus\par Negative lachman\par Negative varus/valgus instability\par ROM 0-90 left, 0-110 right\par Pain with forced extension and flexion\par NVI\par Compartments soft and NT\par \par Xray reviewed and significant for bilateral knee arthritis, advanced med comp narrowing\par \par mri left knee: arthritis, MMT\par \par Plan\par went over findings\par explained the mri and xrays \par will proceed with visco for the oa\par PT script provided\par fu pending auth\par tx options discussed\par

## 2023-02-17 ENCOUNTER — APPOINTMENT (OUTPATIENT)
Dept: ORTHOPEDIC SURGERY | Facility: CLINIC | Age: 73
End: 2023-02-17

## 2023-02-21 LAB — METHYLMALONATE SERPL-SCNC: 250 NMOL/L

## 2023-03-17 ENCOUNTER — RX RENEWAL (OUTPATIENT)
Age: 73
End: 2023-03-17

## 2023-03-31 ENCOUNTER — APPOINTMENT (OUTPATIENT)
Dept: ORTHOPEDIC SURGERY | Facility: CLINIC | Age: 73
End: 2023-03-31

## 2023-08-11 ENCOUNTER — LABORATORY RESULT (OUTPATIENT)
Age: 73
End: 2023-08-11

## 2023-08-11 ENCOUNTER — APPOINTMENT (OUTPATIENT)
Dept: HEMATOLOGY ONCOLOGY | Facility: CLINIC | Age: 73
End: 2023-08-11

## 2023-08-11 ENCOUNTER — OUTPATIENT (OUTPATIENT)
Dept: OUTPATIENT SERVICES | Facility: HOSPITAL | Age: 73
LOS: 1 days | End: 2023-08-11
Payer: MEDICARE

## 2023-08-11 DIAGNOSIS — R92.2 INCONCLUSIVE MAMMOGRAM: ICD-10-CM

## 2023-08-11 DIAGNOSIS — Z85.3 PERSONAL HISTORY OF MALIGNANT NEOPLASM OF BREAST: ICD-10-CM

## 2023-08-11 LAB
ALBUMIN SERPL ELPH-MCNC: 5 G/DL
ALP BLD-CCNC: 120 U/L
ALT SERPL-CCNC: 23 U/L
ANION GAP SERPL CALC-SCNC: 14 MMOL/L
AST SERPL-CCNC: 21 U/L
BILIRUB SERPL-MCNC: 0.4 MG/DL
BUN SERPL-MCNC: 33 MG/DL
CALCIUM SERPL-MCNC: 10.2 MG/DL
CHLORIDE SERPL-SCNC: 100 MMOL/L
CO2 SERPL-SCNC: 25 MMOL/L
CREAT SERPL-MCNC: 1.1 MG/DL
EGFR: 53 ML/MIN/1.73M2
GLUCOSE SERPL-MCNC: 80 MG/DL
HCT VFR BLD CALC: 35.7 %
HGB BLD-MCNC: 11.9 G/DL
IRON SATN MFR SERPL: 22 %
IRON SERPL-MCNC: 69 UG/DL
MCHC RBC-ENTMCNC: 30 PG
MCHC RBC-ENTMCNC: 33.3 G/DL
MCV RBC AUTO: 89.9 FL
PLATELET # BLD AUTO: 296 K/UL
PMV BLD: 9.5 FL
POTASSIUM SERPL-SCNC: 4.5 MMOL/L
PROT SERPL-MCNC: 7.1 G/DL
RBC # BLD: 3.97 M/UL
RBC # FLD: 13 %
SODIUM SERPL-SCNC: 139 MMOL/L
TIBC SERPL-MCNC: 312 UG/DL
UIBC SERPL-MCNC: 243 UG/DL
WBC # FLD AUTO: 7.61 K/UL

## 2023-08-11 PROCEDURE — 36415 COLL VENOUS BLD VENIPUNCTURE: CPT

## 2023-08-11 PROCEDURE — 82248 BILIRUBIN DIRECT: CPT

## 2023-08-11 PROCEDURE — 83540 ASSAY OF IRON: CPT

## 2023-08-11 PROCEDURE — 83550 IRON BINDING TEST: CPT

## 2023-08-11 PROCEDURE — 82728 ASSAY OF FERRITIN: CPT

## 2023-08-11 PROCEDURE — 85027 COMPLETE CBC AUTOMATED: CPT

## 2023-08-11 PROCEDURE — 80053 COMPREHEN METABOLIC PANEL: CPT

## 2023-08-12 DIAGNOSIS — R92.2 INCONCLUSIVE MAMMOGRAM: ICD-10-CM

## 2023-08-14 LAB — FERRITIN SERPL-MCNC: 93 NG/ML

## 2023-08-15 ENCOUNTER — APPOINTMENT (OUTPATIENT)
Dept: HEMATOLOGY ONCOLOGY | Facility: CLINIC | Age: 73
End: 2023-08-15
Payer: MEDICARE

## 2023-08-15 ENCOUNTER — OUTPATIENT (OUTPATIENT)
Dept: OUTPATIENT SERVICES | Facility: HOSPITAL | Age: 73
LOS: 1 days | End: 2023-08-15
Payer: MEDICARE

## 2023-08-15 ENCOUNTER — APPOINTMENT (OUTPATIENT)
Dept: HEMATOLOGY ONCOLOGY | Facility: CLINIC | Age: 73
End: 2023-08-15

## 2023-08-15 VITALS
WEIGHT: 184 LBS | RESPIRATION RATE: 16 BRPM | HEIGHT: 65 IN | HEART RATE: 91 BPM | SYSTOLIC BLOOD PRESSURE: 130 MMHG | DIASTOLIC BLOOD PRESSURE: 64 MMHG | BODY MASS INDEX: 30.66 KG/M2

## 2023-08-15 DIAGNOSIS — Z85.3 PERSONAL HISTORY OF MALIGNANT NEOPLASM OF BREAST: ICD-10-CM

## 2023-08-15 DIAGNOSIS — I10 ESSENTIAL (PRIMARY) HYPERTENSION: ICD-10-CM

## 2023-08-15 LAB
ANION GAP SERPL CALC-SCNC: 14 MMOL/L
BUN SERPL-MCNC: 27 MG/DL
CALCIUM SERPL-MCNC: 10.4 MG/DL
CHLORIDE SERPL-SCNC: 99 MMOL/L
CO2 SERPL-SCNC: 26 MMOL/L
CREAT SERPL-MCNC: 0.8 MG/DL
EGFR: 78 ML/MIN/1.73M2
GLUCOSE SERPL-MCNC: 106 MG/DL
POTASSIUM SERPL-SCNC: 4.5 MMOL/L
SODIUM SERPL-SCNC: 139 MMOL/L

## 2023-08-15 PROCEDURE — 99214 OFFICE O/P EST MOD 30 MIN: CPT

## 2023-08-15 PROCEDURE — 80048 BASIC METABOLIC PNL TOTAL CA: CPT

## 2023-08-15 RX ORDER — AMLODIPINE BESYLATE 5 MG/1
5 TABLET ORAL
Refills: 0 | Status: ACTIVE | COMMUNITY

## 2023-08-15 RX ORDER — LOSARTAN POTASSIUM 25 MG/1
25 TABLET, FILM COATED ORAL
Refills: 0 | Status: ACTIVE | COMMUNITY

## 2023-08-15 RX ORDER — HYDROCHLOROTHIAZIDE 12.5 MG/1
12.5 TABLET ORAL
Refills: 0 | Status: ACTIVE | COMMUNITY

## 2023-08-15 RX ORDER — ANASTROZOLE TABLETS 1 MG/1
1 TABLET ORAL DAILY
Qty: 90 | Refills: 3 | Status: ACTIVE | COMMUNITY
Start: 2022-03-15 | End: 1900-01-01

## 2023-08-15 NOTE — ASSESSMENT
[Curative] : Goals of care discussed with patient: Curative [FreeTextEntry1] : # Left papillary carcinoma stage 0 TiCNXMX, ER/MD+, HER2-; s/p lumpectomy 2014 f/b RT and tamoxifen - previously suggested hormonal blockade for total of 10 years (started 2014). However she was having hot flashes almost every day and stopped after she completed the remainder of her script in 2019 but was lost to followup so she completed at 5 year sandra  - c/w Anastrazole 1mg daily (resumed hormonal blockade therapy 03/2022), recommendations for 10 years of therapy; Rx renewed   # Left DCIS s/p lumpectomy 2014 - dexa scan from May 2022 reviewed ; normal density - she is on Vit D/Ca supplementation  - had a discussion regarding role of biphosphonate therapy such as Zometa monthly or Denosumab q6 months to start, she will require dental clearance, form provided. She is not interested in staring it as of now. - mammogram bilateral from 11/2022 BI-RADS Category 2: Benign - followup with [facility], as scheduled ; she is due for mammogram around Nov 2023 - continue breast exams/ breast clinic  We will repeat BMP today to observe trend of eGFR.  If any worsening, will advise to speak with PCP and consider sending myeloma panel.    RTC in 6 months with CBC, BMP, LFTs, iron studies, ferritin level 2 days prior; sooner if needed

## 2023-08-15 NOTE — HISTORY OF PRESENT ILLNESS
[de-identified] : 67 yo woman comes for a follow up. She was diagnosed with left breast papillary carcinoma s/p resection in 2014 Stage O TisNXMX, ER/TN+,HER2-, f/b RT and hormonal blockade with tamoxifen. Repeat mammogram revealed an abnormality in 2014 and lumpectomy revealed left DCIS. She continued taking tamoxifen. \par  \par  FH : SISTER had breast cancer at the age of 30; father had lung cancer\par  PMH: HTN, arthritis\par  \par  DEXA 2019: no evidence of osteopenia/osteoporosis\par  \par  Dexa Scan 2022 : Negative for osteopenia/osteoporosis [de-identified] : 08/20/2019 Here for a scheduled follow up visit.  Completed 5 years of tamoxifen. tolerated well but has bad hot flashes almost every day. Denies joint pains.  Mammogram due in 2019 october  3/15/22 Patient is here for a follow-up visit for DCIS.  She has not been seen in the clinic since 2019.  Back in 2019, she stopped Tamoxifen (after completing 5 years of therapy ; however, she was previously instructed to continue for a total of 10 year duration).  She states she previously tolerated Tamoxifen without significant issue (hot flashes) but did not have any medication remaining and failed to call or followup in the clinic.  She is feeling well with no new complaints.  Reviewed most recent mammography which is essentially stable.  Reviewed note from [facility] SURG, Dr. Short, which recommended repeat mammography around May 2022.  Patient denies fever, chills, nausea, vomiting, dyspnea or bleeding.  Mammogram Bilateral (11.24.2021) Impression:Multiple stable subcentimeter oval circumscribed masses in the superior and lateral aspects of the right breast. Short interval follow-up recommended.Stable postoperative changes of the left breast. Recommendation: Follow-up unilateral right diagnostic mammogram in 6 months. BI-RADS category 3: Probably Benign  4/8/22 Patient is here for a follow-up visit for Left papillary carcinoma + DCIS.  Since last visit, she resumed hormonal blockade therapy, started Anastrazole once daily about 3 weeks ago.  She reports tolerating AI fine.  She is feeling well with no new complaints.  Patient denies fever, chills, nausea, vomiting, dyspnea or bleeding.  8/17/2022 Patient is here for 3 months follow up. She feels well and she has no complaints or concerns. She denies any changes in weight or bone pains. She is following up with Dr. Das (breast surgeon). She completed mammo and Dexa scan in the interim.   8/15/23 Patient is here for a follow-up visit for Left papillary carcinoma + DCIS.  She remains on Anastrazole daily.  She is feeling well with no new complaints.  Patient denies fever, chills,  CP, palpitations, breast discharge, new palpable lumps/bumps, dyspnea or bleeding.  Reviewed mammography from 11/2022 which was  BI-RADS Category 2: Benign.  Last colonoscopy was about 1 year ago with GI, Dr. Newman which was reportedly normal.  She has planned mammography in November 2023.   Mammogram Dx Bilateral (11.15.2022)Impression: No mammographic or sonographic evidence of malignancy. BI-RADS Category 2: Benign

## 2023-08-15 NOTE — CONSULT LETTER
[Dear  ___] : Dear  [unfilled], [Please see my note below.] : Please see my note below. [FreeTextEntry3] : Vernon Orozco NP

## 2023-10-10 ENCOUNTER — RX RENEWAL (OUTPATIENT)
Age: 73
End: 2023-10-10

## 2023-11-30 ENCOUNTER — RESULT REVIEW (OUTPATIENT)
Age: 73
End: 2023-11-30

## 2023-11-30 ENCOUNTER — OUTPATIENT (OUTPATIENT)
Dept: OUTPATIENT SERVICES | Facility: HOSPITAL | Age: 73
LOS: 1 days | End: 2023-11-30
Payer: MEDICARE

## 2023-11-30 DIAGNOSIS — Z00.8 ENCOUNTER FOR OTHER GENERAL EXAMINATION: ICD-10-CM

## 2023-11-30 DIAGNOSIS — Z12.31 ENCOUNTER FOR SCREENING MAMMOGRAM FOR MALIGNANT NEOPLASM OF BREAST: ICD-10-CM

## 2023-11-30 PROCEDURE — 77067 SCR MAMMO BI INCL CAD: CPT

## 2023-11-30 PROCEDURE — 77067 SCR MAMMO BI INCL CAD: CPT | Mod: 26

## 2023-11-30 PROCEDURE — 76641 ULTRASOUND BREAST COMPLETE: CPT | Mod: 26,50

## 2023-11-30 PROCEDURE — 76641 ULTRASOUND BREAST COMPLETE: CPT | Mod: 50

## 2023-11-30 PROCEDURE — 77063 BREAST TOMOSYNTHESIS BI: CPT | Mod: 26

## 2023-11-30 PROCEDURE — 77063 BREAST TOMOSYNTHESIS BI: CPT

## 2023-12-01 DIAGNOSIS — Z12.31 ENCOUNTER FOR SCREENING MAMMOGRAM FOR MALIGNANT NEOPLASM OF BREAST: ICD-10-CM

## 2023-12-04 ENCOUNTER — APPOINTMENT (OUTPATIENT)
Dept: BREAST CENTER | Facility: CLINIC | Age: 73
End: 2023-12-04
Payer: MEDICARE

## 2023-12-04 VITALS
DIASTOLIC BLOOD PRESSURE: 77 MMHG | HEIGHT: 65 IN | SYSTOLIC BLOOD PRESSURE: 133 MMHG | BODY MASS INDEX: 30.32 KG/M2 | HEART RATE: 95 BPM | WEIGHT: 182 LBS

## 2023-12-04 DIAGNOSIS — R92.30 DENSE BREASTS, UNSPECIFIED: ICD-10-CM

## 2023-12-04 PROCEDURE — 99212 OFFICE O/P EST SF 10 MIN: CPT

## 2024-02-12 ENCOUNTER — APPOINTMENT (OUTPATIENT)
Dept: HEMATOLOGY ONCOLOGY | Facility: CLINIC | Age: 74
End: 2024-02-12

## 2024-02-12 ENCOUNTER — OUTPATIENT (OUTPATIENT)
Dept: OUTPATIENT SERVICES | Facility: HOSPITAL | Age: 74
LOS: 1 days | End: 2024-02-12
Payer: MEDICARE

## 2024-02-12 ENCOUNTER — LABORATORY RESULT (OUTPATIENT)
Age: 74
End: 2024-02-12

## 2024-02-12 DIAGNOSIS — Z85.3 PERSONAL HISTORY OF MALIGNANT NEOPLASM OF BREAST: ICD-10-CM

## 2024-02-12 LAB
ALBUMIN SERPL ELPH-MCNC: 4.8 G/DL
ALP BLD-CCNC: 108 U/L
ALT SERPL-CCNC: 29 U/L
ANION GAP SERPL CALC-SCNC: 17 MMOL/L
AST SERPL-CCNC: 26 U/L
BILIRUB DIRECT SERPL-MCNC: <0.2 MG/DL
BILIRUB INDIRECT SERPL-MCNC: >0.2 MG/DL
BILIRUB SERPL-MCNC: 0.4 MG/DL
BUN SERPL-MCNC: 20 MG/DL
CALCIUM SERPL-MCNC: 9.9 MG/DL
CHLORIDE SERPL-SCNC: 100 MMOL/L
CO2 SERPL-SCNC: 24 MMOL/L
CREAT SERPL-MCNC: 0.9 MG/DL
EGFR: 68 ML/MIN/1.73M2
GLUCOSE SERPL-MCNC: 98 MG/DL
HCT VFR BLD CALC: 33.5 %
HGB BLD-MCNC: 11.6 G/DL
IRON SATN MFR SERPL: 24 %
IRON SERPL-MCNC: 74 UG/DL
MCHC RBC-ENTMCNC: 30.6 PG
MCHC RBC-ENTMCNC: 34.6 G/DL
MCV RBC AUTO: 88.4 FL
PLATELET # BLD AUTO: 267 K/UL
PMV BLD: 9.2 FL
POTASSIUM SERPL-SCNC: 4 MMOL/L
PROT SERPL-MCNC: 7.1 G/DL
RBC # BLD: 3.79 M/UL
RBC # FLD: 13 %
SODIUM SERPL-SCNC: 141 MMOL/L
TIBC SERPL-MCNC: 308 UG/DL
UIBC SERPL-MCNC: 234 UG/DL
WBC # FLD AUTO: 5.52 K/UL

## 2024-02-12 PROCEDURE — 83550 IRON BINDING TEST: CPT

## 2024-02-12 PROCEDURE — 82728 ASSAY OF FERRITIN: CPT

## 2024-02-12 PROCEDURE — 83540 ASSAY OF IRON: CPT

## 2024-02-12 PROCEDURE — 36415 COLL VENOUS BLD VENIPUNCTURE: CPT

## 2024-02-12 PROCEDURE — 85027 COMPLETE CBC AUTOMATED: CPT

## 2024-02-12 PROCEDURE — 80048 BASIC METABOLIC PNL TOTAL CA: CPT

## 2024-02-12 PROCEDURE — 80076 HEPATIC FUNCTION PANEL: CPT

## 2024-02-13 DIAGNOSIS — Z85.3 PERSONAL HISTORY OF MALIGNANT NEOPLASM OF BREAST: ICD-10-CM

## 2024-02-13 LAB — FERRITIN SERPL-MCNC: 81 NG/ML

## 2024-02-14 ENCOUNTER — APPOINTMENT (OUTPATIENT)
Dept: HEMATOLOGY ONCOLOGY | Facility: CLINIC | Age: 74
End: 2024-02-14
Payer: MEDICARE

## 2024-02-14 ENCOUNTER — OUTPATIENT (OUTPATIENT)
Dept: OUTPATIENT SERVICES | Facility: HOSPITAL | Age: 74
LOS: 1 days | End: 2024-02-14
Payer: MEDICARE

## 2024-02-14 ENCOUNTER — APPOINTMENT (OUTPATIENT)
Dept: HEMATOLOGY ONCOLOGY | Facility: CLINIC | Age: 74
End: 2024-02-14

## 2024-02-14 VITALS
HEIGHT: 65 IN | TEMPERATURE: 98.6 F | BODY MASS INDEX: 31.49 KG/M2 | DIASTOLIC BLOOD PRESSURE: 69 MMHG | WEIGHT: 189 LBS | HEART RATE: 97 BPM | SYSTOLIC BLOOD PRESSURE: 141 MMHG | RESPIRATION RATE: 16 BRPM

## 2024-02-14 DIAGNOSIS — Z85.3 PERSONAL HISTORY OF MALIGNANT NEOPLASM OF BREAST: ICD-10-CM

## 2024-02-14 DIAGNOSIS — D05.10 INTRADUCTAL CARCINOMA IN SITU OF UNSPECIFIED BREAST: ICD-10-CM

## 2024-02-14 PROCEDURE — 99214 OFFICE O/P EST MOD 30 MIN: CPT

## 2024-02-14 NOTE — ASSESSMENT
[Curative] : Goals of care discussed with patient: Curative [FreeTextEntry1] : # Left papillary carcinoma stage 0 TiCNXMX, ER/GA+, HER2-; s/p lumpectomy 2014 f/b RT and tamoxifen - previously suggested hormonal blockade for total of 10 years (started 2014). However she was having hot flashes almost every day and stopped after she completed the remainder of her script in 2019 but was lost to followup so she completed at 5 year sandra  - c/w Anastrazole 1mg daily (resumed hormonal blockade therapy 03/2022), recommendations for 10 years of therapy until 10/2024 then STOP   # Left DCIS s/p lumpectomy 2014 - dexa scan from May 2022 reviewed ; normal density  - she is on Vit D/Ca supplementation  - had a discussion regarding role of biphosphonate therapy such as Zometa monthly or Denosumab q6 months to start, she will require dental clearance, form provided. She is not interested in staring it as of now.  - mammogram bilateral from 11/2023 BI-RADS Category 2: Benign  - followup with [facility]AST SURG, as scheduled ; she is due for mammogram around Nov 2023  - continue breast exams/ breast clinic   Encouraged to keep up to date with age appropriate screenings including but not limited to GYN pelvic exam, colonoscopy and upper endoscopy.    RTC in 8 months with CBC, BMP, LFTs 2 days prior; sooner if needed  seen/ examined w/ NP Maricruz; note reviewed;case discussed; agree w/ plan

## 2024-02-14 NOTE — HISTORY OF PRESENT ILLNESS
[de-identified] : 69 yo woman comes for a follow up. She was diagnosed with left breast papillary carcinoma s/p resection in 2014 Stage O TisNXMX, ER/IN+,HER2-, f/b RT and hormonal blockade with tamoxifen. Repeat mammogram revealed an abnormality in 2014 and lumpectomy revealed left DCIS. She continued taking tamoxifen. \par  \par  FH : SISTER had breast cancer at the age of 30; father had lung cancer\par  PMH: HTN, arthritis\par  \par  DEXA 2019: no evidence of osteopenia/osteoporosis\par  \par  Dexa Scan 2022 : Negative for osteopenia/osteoporosis [de-identified] : 08/20/2019 Here for a scheduled follow up visit.  Completed 5 years of tamoxifen. tolerated well but has bad hot flashes almost every day. Denies joint pains.  Mammogram due in 2019 october  3/15/22 Patient is here for a follow-up visit for DCIS.  She has not been seen in the clinic since 2019.  Back in 2019, she stopped Tamoxifen (after completing 5 years of therapy ; however, she was previously instructed to continue for a total of 10 year duration).  She states she previously tolerated Tamoxifen without significant issue (hot flashes) but did not have any medication remaining and failed to call or followup in the clinic.  She is feeling well with no new complaints.  Reviewed most recent mammography which is essentially stable.  Reviewed note from [facility] SURG, Dr. Short, which recommended repeat mammography around May 2022.  Patient denies fever, chills, nausea, vomiting, dyspnea or bleeding.  Mammogram Bilateral (11.24.2021) Impression:Multiple stable subcentimeter oval circumscribed masses in the superior and lateral aspects of the right breast. Short interval follow-up recommended.Stable postoperative changes of the left breast. Recommendation: Follow-up unilateral right diagnostic mammogram in 6 months. BI-RADS category 3: Probably Benign  4/8/22 Patient is here for a follow-up visit for Left papillary carcinoma + DCIS.  Since last visit, she resumed hormonal blockade therapy, started Anastrazole once daily about 3 weeks ago.  She reports tolerating AI fine.  She is feeling well with no new complaints.  Patient denies fever, chills, nausea, vomiting, dyspnea or bleeding.  8/17/2022 Patient is here for 3 months follow up. She feels well and she has no complaints or concerns. She denies any changes in weight or bone pains. She is following up with Dr. Das (breast surgeon). She completed mammo and Dexa scan in the interim.   8/15/23 Patient is here for a follow-up visit for Left papillary carcinoma + DCIS.  She remains on Anastrazole daily.  She is feeling well with no new complaints.  Patient denies fever, chills,  CP, palpitations, breast discharge, new palpable lumps/bumps, dyspnea or bleeding.  Reviewed mammography from 11/2022 which was  BI-RADS Category 2: Benign.  Last colonoscopy was about 1 year ago with GI, Dr. Newman which was reportedly normal.  She has planned mammography in November 2023.   Mammogram Dx Bilateral (11.15.2022)Impression: No mammographic or sonographic evidence of malignancy. BI-RADS Category 2: Benign  2/14/24 Patient is here for a follow-up visit for Left papillary carcinoma + DCIS.  She remains on Anastrazole daily, initiated 10/2014.  She is feeling well with no new complaints.  Patient denies fever, chills,  CP, palpitations, breast discharge, new palpable lumps/bumps, dyspnea or bleeding.  Reviewed mammography from 11/2023 which was  BI-RADS Category 2: Benign.  She has planned mammography in November 2024.   Mammogram Bilateral (11.30.2023)Impression: There is no mammographic evidence of malignancy. BI-RADS Category 2: Benign

## 2024-10-16 ENCOUNTER — LABORATORY RESULT (OUTPATIENT)
Age: 74
End: 2024-10-16

## 2024-10-16 ENCOUNTER — APPOINTMENT (OUTPATIENT)
Age: 74
End: 2024-10-16
Payer: MEDICARE

## 2024-10-16 ENCOUNTER — OUTPATIENT (OUTPATIENT)
Dept: OUTPATIENT SERVICES | Facility: HOSPITAL | Age: 74
LOS: 1 days | End: 2024-10-16
Payer: MEDICARE

## 2024-10-16 VITALS
HEIGHT: 64 IN | WEIGHT: 180 LBS | SYSTOLIC BLOOD PRESSURE: 145 MMHG | HEART RATE: 99 BPM | BODY MASS INDEX: 30.73 KG/M2 | DIASTOLIC BLOOD PRESSURE: 74 MMHG | RESPIRATION RATE: 16 BRPM | TEMPERATURE: 97.9 F | OXYGEN SATURATION: 99 %

## 2024-10-16 DIAGNOSIS — D05.10 INTRADUCTAL CARCINOMA IN SITU OF UNSPECIFIED BREAST: ICD-10-CM

## 2024-10-16 DIAGNOSIS — Z85.3 PERSONAL HISTORY OF MALIGNANT NEOPLASM OF BREAST: ICD-10-CM

## 2024-10-16 LAB
ALBUMIN SERPL ELPH-MCNC: 4.8 G/DL
ALP BLD-CCNC: 112 U/L
ALT SERPL-CCNC: 15 U/L
ANION GAP SERPL CALC-SCNC: 12 MMOL/L
AST SERPL-CCNC: 16 U/L
BILIRUB DIRECT SERPL-MCNC: <0.2 MG/DL
BILIRUB INDIRECT SERPL-MCNC: >0.2 MG/DL
BILIRUB SERPL-MCNC: 0.4 MG/DL
BUN SERPL-MCNC: 19 MG/DL
CALCIUM SERPL-MCNC: 10.1 MG/DL
CHLORIDE SERPL-SCNC: 104 MMOL/L
CO2 SERPL-SCNC: 25 MMOL/L
CREAT SERPL-MCNC: 0.8 MG/DL
EGFR: 78 ML/MIN/1.73M2
GLUCOSE SERPL-MCNC: 93 MG/DL
HCT VFR BLD CALC: 32.2 %
HGB BLD-MCNC: 11.1 G/DL
MCHC RBC-ENTMCNC: 30.4 PG
MCHC RBC-ENTMCNC: 34.5 G/DL
MCV RBC AUTO: 88.2 FL
PLATELET # BLD AUTO: 311 K/UL
PMV BLD: 9 FL
POTASSIUM SERPL-SCNC: 4.9 MMOL/L
PROT SERPL-MCNC: 6.9 G/DL
RBC # BLD: 3.65 M/UL
RBC # FLD: 13.1 %
SODIUM SERPL-SCNC: 141 MMOL/L
WBC # FLD AUTO: 6.31 K/UL

## 2024-10-16 PROCEDURE — G2211 COMPLEX E/M VISIT ADD ON: CPT

## 2024-10-16 PROCEDURE — 99214 OFFICE O/P EST MOD 30 MIN: CPT

## 2024-10-16 PROCEDURE — 80048 BASIC METABOLIC PNL TOTAL CA: CPT

## 2024-10-16 PROCEDURE — 85027 COMPLETE CBC AUTOMATED: CPT

## 2024-10-16 PROCEDURE — 80076 HEPATIC FUNCTION PANEL: CPT

## 2024-10-17 DIAGNOSIS — D05.10 INTRADUCTAL CARCINOMA IN SITU OF UNSPECIFIED BREAST: ICD-10-CM

## 2024-12-09 ENCOUNTER — APPOINTMENT (OUTPATIENT)
Dept: BREAST CENTER | Facility: CLINIC | Age: 74
End: 2024-12-09

## 2025-02-17 ENCOUNTER — RESULT REVIEW (OUTPATIENT)
Age: 75
End: 2025-02-17

## 2025-02-17 ENCOUNTER — OUTPATIENT (OUTPATIENT)
Dept: OUTPATIENT SERVICES | Facility: HOSPITAL | Age: 75
LOS: 1 days | End: 2025-02-17
Payer: MEDICARE

## 2025-02-17 DIAGNOSIS — R92.2 INCONCLUSIVE MAMMOGRAM: ICD-10-CM

## 2025-02-17 DIAGNOSIS — Z12.31 ENCOUNTER FOR SCREENING MAMMOGRAM FOR MALIGNANT NEOPLASM OF BREAST: ICD-10-CM

## 2025-02-17 PROCEDURE — 77067 SCR MAMMO BI INCL CAD: CPT

## 2025-02-17 PROCEDURE — 77063 BREAST TOMOSYNTHESIS BI: CPT | Mod: 26

## 2025-02-17 PROCEDURE — 76641 ULTRASOUND BREAST COMPLETE: CPT | Mod: 26,50

## 2025-02-17 PROCEDURE — 77067 SCR MAMMO BI INCL CAD: CPT | Mod: 26

## 2025-02-17 PROCEDURE — 77063 BREAST TOMOSYNTHESIS BI: CPT

## 2025-02-17 PROCEDURE — 76641 ULTRASOUND BREAST COMPLETE: CPT | Mod: 50

## 2025-02-18 DIAGNOSIS — R92.2 INCONCLUSIVE MAMMOGRAM: ICD-10-CM

## 2025-02-18 DIAGNOSIS — Z12.31 ENCOUNTER FOR SCREENING MAMMOGRAM FOR MALIGNANT NEOPLASM OF BREAST: ICD-10-CM
